# Patient Record
Sex: MALE | Race: WHITE | Employment: OTHER | URBAN - METROPOLITAN AREA
[De-identification: names, ages, dates, MRNs, and addresses within clinical notes are randomized per-mention and may not be internally consistent; named-entity substitution may affect disease eponyms.]

---

## 2017-08-10 ENCOUNTER — HOSPITAL ENCOUNTER (EMERGENCY)
Age: 62
Discharge: HOME | End: 2017-08-10
Attending: EMERGENCY MEDICINE | Admitting: EMERGENCY MEDICINE
Payer: MEDICARE

## 2017-08-10 ENCOUNTER — APPOINTMENT (OUTPATIENT)
Dept: GENERAL RADIOLOGY | Age: 62
End: 2017-08-10
Attending: EMERGENCY MEDICINE
Payer: MEDICARE

## 2017-08-10 ENCOUNTER — APPOINTMENT (OUTPATIENT)
Dept: CT IMAGING | Age: 62
End: 2017-08-10
Attending: EMERGENCY MEDICINE
Payer: MEDICARE

## 2017-08-10 VITALS
OXYGEN SATURATION: 98 % | RESPIRATION RATE: 20 BRPM | DIASTOLIC BLOOD PRESSURE: 66 MMHG | HEART RATE: 65 BPM | TEMPERATURE: 98.1 F | SYSTOLIC BLOOD PRESSURE: 127 MMHG

## 2017-08-10 DIAGNOSIS — R07.9 CHEST PAIN, UNSPECIFIED TYPE: Primary | ICD-10-CM

## 2017-08-10 LAB
ALBUMIN SERPL BCP-MCNC: 3.5 G/DL (ref 3.5–5)
ALBUMIN/GLOB SERPL: 0.8 {RATIO} (ref 1.1–2.2)
ALP SERPL-CCNC: 83 U/L (ref 45–117)
ALT SERPL-CCNC: 28 U/L (ref 12–78)
ANION GAP BLD CALC-SCNC: 4 MMOL/L (ref 5–15)
AST SERPL W P-5'-P-CCNC: 24 U/L (ref 15–37)
BASOPHILS # BLD AUTO: 0.1 K/UL (ref 0–0.1)
BASOPHILS # BLD: 1 % (ref 0–1)
BILIRUB SERPL-MCNC: 0.4 MG/DL (ref 0.2–1)
BUN SERPL-MCNC: 12 MG/DL (ref 6–20)
BUN/CREAT SERPL: 11 (ref 12–20)
CALCIUM SERPL-MCNC: 8.4 MG/DL (ref 8.5–10.1)
CHLORIDE SERPL-SCNC: 101 MMOL/L (ref 97–108)
CO2 SERPL-SCNC: 30 MMOL/L (ref 21–32)
CREAT SERPL-MCNC: 1.09 MG/DL (ref 0.7–1.3)
EOSINOPHIL # BLD: 0.2 K/UL (ref 0–0.4)
EOSINOPHIL NFR BLD: 2 % (ref 0–7)
ERYTHROCYTE [DISTWIDTH] IN BLOOD BY AUTOMATED COUNT: 13 % (ref 11.5–14.5)
GLOBULIN SER CALC-MCNC: 4.4 G/DL (ref 2–4)
GLUCOSE SERPL-MCNC: 117 MG/DL (ref 65–100)
HCT VFR BLD AUTO: 42.3 % (ref 36.6–50.3)
HGB BLD-MCNC: 14.4 G/DL (ref 12.1–17)
LYMPHOCYTES # BLD AUTO: 15 % (ref 12–49)
LYMPHOCYTES # BLD: 1.6 K/UL (ref 0.8–3.5)
MCH RBC QN AUTO: 29.4 PG (ref 26–34)
MCHC RBC AUTO-ENTMCNC: 34 G/DL (ref 30–36.5)
MCV RBC AUTO: 86.3 FL (ref 80–99)
MONOCYTES # BLD: 0.7 K/UL (ref 0–1)
MONOCYTES NFR BLD AUTO: 7 % (ref 5–13)
NEUTS SEG # BLD: 8.2 K/UL (ref 1.8–8)
NEUTS SEG NFR BLD AUTO: 75 % (ref 32–75)
PLATELET # BLD AUTO: 372 K/UL (ref 150–400)
POTASSIUM SERPL-SCNC: 3.8 MMOL/L (ref 3.5–5.1)
PROT SERPL-MCNC: 7.9 G/DL (ref 6.4–8.2)
RBC # BLD AUTO: 4.9 M/UL (ref 4.1–5.7)
SODIUM SERPL-SCNC: 135 MMOL/L (ref 136–145)
TROPONIN I SERPL-MCNC: <0.04 NG/ML
WBC # BLD AUTO: 10.8 K/UL (ref 4.1–11.1)

## 2017-08-10 PROCEDURE — 85025 COMPLETE CBC W/AUTO DIFF WBC: CPT | Performed by: EMERGENCY MEDICINE

## 2017-08-10 PROCEDURE — 74011250636 HC RX REV CODE- 250/636: Performed by: EMERGENCY MEDICINE

## 2017-08-10 PROCEDURE — 96374 THER/PROPH/DIAG INJ IV PUSH: CPT

## 2017-08-10 PROCEDURE — 74177 CT ABD & PELVIS W/CONTRAST: CPT

## 2017-08-10 PROCEDURE — 96375 TX/PRO/DX INJ NEW DRUG ADDON: CPT

## 2017-08-10 PROCEDURE — 74020 XR ABD (AP AND ERECT OR DECUB): CPT

## 2017-08-10 PROCEDURE — 73564 X-RAY EXAM KNEE 4 OR MORE: CPT

## 2017-08-10 PROCEDURE — 80053 COMPREHEN METABOLIC PANEL: CPT | Performed by: EMERGENCY MEDICINE

## 2017-08-10 PROCEDURE — 74011636320 HC RX REV CODE- 636/320: Performed by: EMERGENCY MEDICINE

## 2017-08-10 PROCEDURE — 84484 ASSAY OF TROPONIN QUANT: CPT | Performed by: EMERGENCY MEDICINE

## 2017-08-10 PROCEDURE — 74011000258 HC RX REV CODE- 258: Performed by: EMERGENCY MEDICINE

## 2017-08-10 PROCEDURE — 93005 ELECTROCARDIOGRAM TRACING: CPT

## 2017-08-10 PROCEDURE — 73610 X-RAY EXAM OF ANKLE: CPT

## 2017-08-10 PROCEDURE — 71020 XR CHEST PA LAT: CPT

## 2017-08-10 PROCEDURE — 99284 EMERGENCY DEPT VISIT MOD MDM: CPT

## 2017-08-10 RX ORDER — PREDNISONE 20 MG/1
TABLET ORAL
Qty: 20 TAB | Refills: 0 | Status: SHIPPED | OUTPATIENT
Start: 2017-08-10

## 2017-08-10 RX ORDER — KETOROLAC TROMETHAMINE 30 MG/ML
30 INJECTION, SOLUTION INTRAMUSCULAR; INTRAVENOUS
Status: COMPLETED | OUTPATIENT
Start: 2017-08-10 | End: 2017-08-10

## 2017-08-10 RX ORDER — SODIUM CHLORIDE 0.9 % (FLUSH) 0.9 %
10 SYRINGE (ML) INJECTION
Status: COMPLETED | OUTPATIENT
Start: 2017-08-10 | End: 2017-08-10

## 2017-08-10 RX ORDER — MORPHINE SULFATE 10 MG/ML
8 INJECTION, SOLUTION INTRAMUSCULAR; INTRAVENOUS
Status: COMPLETED | OUTPATIENT
Start: 2017-08-10 | End: 2017-08-10

## 2017-08-10 RX ADMIN — IOPAMIDOL 100 ML: 755 INJECTION, SOLUTION INTRAVENOUS at 18:13

## 2017-08-10 RX ADMIN — MORPHINE SULFATE 8 MG: 10 INJECTION, SOLUTION INTRAMUSCULAR; INTRAVENOUS at 17:53

## 2017-08-10 RX ADMIN — Medication 10 ML: at 18:13

## 2017-08-10 RX ADMIN — SODIUM CHLORIDE 100 ML: 900 INJECTION, SOLUTION INTRAVENOUS at 18:13

## 2017-08-10 RX ADMIN — KETOROLAC TROMETHAMINE 30 MG: 30 INJECTION, SOLUTION INTRAMUSCULAR at 16:51

## 2017-08-10 NOTE — ED PROVIDER NOTES
HPI Comments: 57 y.o. male with past medical history significant for hypertension, kidney stones, appendectomy, cholecystectomy, and back surgery (L5-S1) who presents from home via EMS with chief complaint of chest pain. Patient states onset of intermittent, moderate, and mid-sternal chest pain since this morning at 0930 approximately 7 hours ago. Patient mentions the pain is \"sharp\" and lasts approximately 15-20 minutes each episode, but he had a prolonged episode prior to calling EMS today. Patient reports the chest pain is aggravated upon movement and upon palpation. Patient denies any worsening pain with deep breaths. Patient admits to some pain at this time but it is minimal. Patient complains of accompanying mild shortness of breath and nausea. Patient notes chronic bilateral leg swelling that has not changed. Patient reports he typically walks with a cane; however, the wife states the patient has been unable to walk for the past 3 days. Patient suspects this is from new moderate right-sided knee pain that feels like it is \"splitting from (his) body. \" Patient reports accompanying right thigh pain described as \"knife in right thigh. \" Patient also complains of bilateral ankle pain that is worse on the right side. Patient mentions hx of sarcoidosis, which apparently feels similar to today but his chest pain is worse now. Patient mentions hx of arthritis and has used steroids in the past. Patient reports he had a stress test \"years ago\" that was unremarkable. Patient denies any recent heart catheterizations or heart attacks. Patient only mentions family hx of heart attacks and he has a hx of hypertension. Patient reports taking Lasix and HCTZ, but recently started on Amitiza for constipation from pain medications. Patient denies vomiting, fever, cough, and any recent illnesses. There are no other acute medical concerns at this time.     Social hx: Tobacco Use: No, Alcohol Use: No    PCP: Jose Rivas MD    Note written by Steve Heaton, as dictated by Brenda Malagon MD 4:19 PM             The history is provided by the patient. Past Medical History:   Diagnosis Date    Hypertension     Other ill-defined conditions     kidney stones       Past Surgical History:   Procedure Laterality Date    HX APPENDECTOMY      HX BACK SURGERY      L5 - S1    HX CHOLECYSTECTOMY           No family history on file. Social History     Social History    Marital status:      Spouse name: N/A    Number of children: N/A    Years of education: N/A     Occupational History    Not on file. Social History Main Topics    Smoking status: Never Smoker    Smokeless tobacco: Never Used    Alcohol use No    Drug use: No    Sexual activity: Not on file     Other Topics Concern    Not on file     Social History Narrative    No narrative on file         ALLERGIES: Review of patient's allergies indicates no known allergies. Review of Systems   Constitutional: Negative for fever. HENT: Negative for congestion. Eyes: Negative for visual disturbance. Respiratory: Positive for shortness of breath. Negative for cough and wheezing. Cardiovascular: Positive for chest pain and leg swelling (chronic). Gastrointestinal: Positive for nausea. Negative for abdominal pain, diarrhea and vomiting. Genitourinary: Negative for dysuria. Musculoskeletal: Positive for arthralgias (right ankle and right knee) and myalgias. Negative for back pain and neck stiffness. Skin: Negative for rash. Neurological: Negative. Negative for syncope and headaches. Psychiatric/Behavioral: Negative for confusion. All other systems reviewed and are negative. There were no vitals filed for this visit. Physical Exam   Constitutional: He appears well-developed and well-nourished. No distress. HENT:   Head: Normocephalic. Eyes: Pupils are equal, round, and reactive to light. Neck: Normal range of motion. Cardiovascular: Normal rate and regular rhythm. No murmur heard. Pt pulse in the right leg is intact    Pulmonary/Chest: Effort normal and breath sounds normal. No respiratory distress. He exhibits tenderness. Some chest sternum tenderness upon palpation that is reproducible    Abdominal: Soft. There is no tenderness. Abdomen is non-tender upon palpation    Musculoskeletal: Normal range of motion. He exhibits tenderness. He exhibits no edema. Right knee has mild tenderness upon palpation. No signs of redness or swelling   Right ankle has mild pain upon palpation    Neurological: He is alert. He has normal strength. No cranial nerve deficit. Skin: Skin is warm and dry. Psychiatric: He has a normal mood and affect. His behavior is normal.   Nursing note and vitals reviewed. Note written by Steve Govea, as dictated by Suri Green MD 4:19 PM    [facility]  ED Course       Procedures    ED EKG interpretation:  Rhythm: normal sinus rhythm; and regular . Rate (approx.): 76; Axis: normal; ST/T wave: non-specific changes; No acute ST changes. Note written by Steve Govea, as dictated by Suri Green MD 4:22 PM    PROGRESS NOTE:  6:48 PM  Discussed results with the patient and his wife. Patient does not have a bowel obstruction at this time and his heart lab results are unremarkable. Will discharge the patient on Prednisone to help with the knee and ankle pain, which is suspected to be from arthritis.

## 2017-08-10 NOTE — ED TRIAGE NOTES
TRIAGE NOTE: Pt arrives via EMS for substernal non radiating chest pain when he woke up ~0930. Pt received nitro and 324ASA via EMS. PT also complains of ongoing RIGHT leg weakness.

## 2017-08-10 NOTE — DISCHARGE INSTRUCTIONS
Chest Pain: Care Instructions  Your Care Instructions  There are many things that can cause chest pain. Some are not serious and will get better on their own in a few days. But some kinds of chest pain need more testing and treatment. Your doctor may have recommended a follow-up visit in the next 8 to 12 hours. If you are not getting better, you may need more tests or treatment. Even though your doctor has released you, you still need to watch for any problems. The doctor carefully checked you, but sometimes problems can develop later. If you have new symptoms or if your symptoms do not get better, get medical care right away. If you have worse or different chest pain or pressure that lasts more than 5 minutes or you passed out (lost consciousness), call 911 or seek other emergency help right away. A medical visit is only one step in your treatment. Even if you feel better, you still need to do what your doctor recommends, such as going to all suggested follow-up appointments and taking medicines exactly as directed. This will help you recover and help prevent future problems. How can you care for yourself at home? · Rest until you feel better. · Take your medicine exactly as prescribed. Call your doctor if you think you are having a problem with your medicine. · Do not drive after taking a prescription pain medicine. When should you call for help? Call 911 if:  · You passed out (lost consciousness). · You have severe difficulty breathing. · You have symptoms of a heart attack. These may include:  ¨ Chest pain or pressure, or a strange feeling in your chest.  ¨ Sweating. ¨ Shortness of breath. ¨ Nausea or vomiting. ¨ Pain, pressure, or a strange feeling in your back, neck, jaw, or upper belly or in one or both shoulders or arms. ¨ Lightheadedness or sudden weakness. ¨ A fast or irregular heartbeat.   After you call 911, the  may tell you to chew 1 adult-strength or 2 to 4 low-dose aspirin. Wait for an ambulance. Do not try to drive yourself. Call your doctor today if:  · You have any trouble breathing. · Your chest pain gets worse. · You are dizzy or lightheaded, or you feel like you may faint. · You are not getting better as expected. · You are having new or different chest pain. Where can you learn more? Go to http://acdy-alex.info/. Enter A120 in the search box to learn more about \"Chest Pain: Care Instructions. \"  Current as of: March 20, 2017  Content Version: 11.3  © 5923-8314 [employer]. Care instructions adapted under license by [employer] (which disclaims liability or warranty for this information). If you have questions about a medical condition or this instruction, always ask your healthcare professional. [address] any warranty or liability for your use of this information.

## 2017-08-10 NOTE — ED NOTES
IV discontinued from the left forearm without any redness. Patient discharged home by Dr. Jung Turk. Prescriptions and discharge instructions given. Patient verbalized understanding of discharge instructions.

## 2017-08-11 LAB
ATRIAL RATE: 76 BPM
CALCULATED P AXIS, ECG09: 54 DEGREES
CALCULATED R AXIS, ECG10: -43 DEGREES
CALCULATED T AXIS, ECG11: 30 DEGREES
DIAGNOSIS, 93000: NORMAL
P-R INTERVAL, ECG05: 124 MS
Q-T INTERVAL, ECG07: 412 MS
QRS DURATION, ECG06: 108 MS
QTC CALCULATION (BEZET), ECG08: 463 MS
VENTRICULAR RATE, ECG03: 76 BPM

## 2018-06-14 ENCOUNTER — ANESTHESIA (OUTPATIENT)
Dept: ENDOSCOPY | Age: 63
End: 2018-06-14

## 2018-06-14 ENCOUNTER — HOSPITAL ENCOUNTER (OUTPATIENT)
Age: 63
Setting detail: OUTPATIENT SURGERY
Discharge: HOME | End: 2018-06-14
Attending: INTERNAL MEDICINE | Admitting: INTERNAL MEDICINE

## 2018-06-14 ENCOUNTER — ANESTHESIA EVENT (OUTPATIENT)
Dept: ENDOSCOPY | Age: 63
End: 2018-06-14

## 2018-06-14 VITALS
RESPIRATION RATE: 22 BRPM | BODY MASS INDEX: 31.12 KG/M2 | WEIGHT: 269 LBS | HEIGHT: 78 IN | DIASTOLIC BLOOD PRESSURE: 74 MMHG | TEMPERATURE: 97.9 F | SYSTOLIC BLOOD PRESSURE: 136 MMHG | HEART RATE: 88 BPM | OXYGEN SATURATION: 98 %

## 2018-06-14 RX ORDER — DEXTROMETHORPHAN/PSEUDOEPHED 2.5-7.5/.8
1.2 DROPS ORAL
Status: DISCONTINUED | OUTPATIENT
Start: 2018-06-14 | End: 2018-06-15 | Stop reason: HOSPADM

## 2018-06-14 RX ORDER — DIPHENHYDRAMINE HYDROCHLORIDE 50 MG/ML
50 INJECTION, SOLUTION INTRAMUSCULAR; INTRAVENOUS ONCE
Status: ACTIVE | OUTPATIENT
Start: 2018-06-14 | End: 2018-06-14

## 2018-06-14 RX ORDER — FENTANYL CITRATE 50 UG/ML
100 INJECTION, SOLUTION INTRAMUSCULAR; INTRAVENOUS
Status: ACTIVE | OUTPATIENT
Start: 2018-06-14 | End: 2018-06-14

## 2018-06-14 RX ORDER — FLUMAZENIL 0.1 MG/ML
0.2 INJECTION INTRAVENOUS
Status: ACTIVE | OUTPATIENT
Start: 2018-06-14 | End: 2018-06-14

## 2018-06-14 RX ORDER — MIDAZOLAM HYDROCHLORIDE 1 MG/ML
.25-1 INJECTION, SOLUTION INTRAMUSCULAR; INTRAVENOUS
Status: ACTIVE | OUTPATIENT
Start: 2018-06-14 | End: 2018-06-14

## 2018-06-14 RX ORDER — EPINEPHRINE 0.1 MG/ML
1 INJECTION INTRACARDIAC; INTRAVENOUS
Status: DISCONTINUED | OUTPATIENT
Start: 2018-06-14 | End: 2018-06-15 | Stop reason: HOSPADM

## 2018-06-14 RX ORDER — ATROPINE SULFATE 0.1 MG/ML
0.5 INJECTION INTRAVENOUS
Status: DISCONTINUED | OUTPATIENT
Start: 2018-06-14 | End: 2018-06-15 | Stop reason: HOSPADM

## 2018-06-14 RX ORDER — SODIUM CHLORIDE 0.9 % (FLUSH) 0.9 %
5-10 SYRINGE (ML) INJECTION EVERY 8 HOURS
Status: ACTIVE | OUTPATIENT
Start: 2018-06-14 | End: 2018-06-14

## 2018-06-14 RX ORDER — SODIUM CHLORIDE 9 MG/ML
100 INJECTION, SOLUTION INTRAVENOUS CONTINUOUS
Status: DISPENSED | OUTPATIENT
Start: 2018-06-14 | End: 2018-06-14

## 2018-06-14 RX ORDER — SODIUM CHLORIDE 0.9 % (FLUSH) 0.9 %
5-10 SYRINGE (ML) INJECTION AS NEEDED
Status: ACTIVE | OUTPATIENT
Start: 2018-06-14 | End: 2018-06-14

## 2018-06-14 RX ORDER — NALOXONE HYDROCHLORIDE 0.4 MG/ML
0.4 INJECTION, SOLUTION INTRAMUSCULAR; INTRAVENOUS; SUBCUTANEOUS
Status: ACTIVE | OUTPATIENT
Start: 2018-06-14 | End: 2018-06-14

## 2018-06-14 RX ORDER — SODIUM CHLORIDE 9 MG/ML
INJECTION, SOLUTION INTRAVENOUS
Status: DISCONTINUED | OUTPATIENT
Start: 2018-06-14 | End: 2018-06-14 | Stop reason: HOSPADM

## 2018-06-14 RX ADMIN — SODIUM CHLORIDE: 9 INJECTION, SOLUTION INTRAVENOUS at 11:00

## 2018-06-14 NOTE — ANESTHESIA PREPROCEDURE EVALUATION
Anesthetic History   No history of anesthetic complications            Review of Systems / Medical History  Patient summary reviewed, nursing notes reviewed and pertinent labs reviewed    Pulmonary  Within defined limits                 Neuro/Psych   Within defined limits           Cardiovascular    Hypertension              Exercise tolerance: >4 METS     GI/Hepatic/Renal         Renal disease: stones      Comments: Dysphagia Endo/Other  Within defined limits           Other Findings              Physical Exam    Airway  Mallampati: III  TM Distance: 4 - 6 cm  Neck ROM: normal range of motion   Mouth opening: Normal     Cardiovascular  Regular rate and rhythm,  S1 and S2 normal,  no murmur, click, rub, or gallop             Dental  No notable dental hx       Pulmonary  Breath sounds clear to auscultation               Abdominal  GI exam deferred       Other Findings            Anesthetic Plan    ASA: 2  Anesthesia type: MAC          Induction: Intravenous  Anesthetic plan and risks discussed with: Patient

## 2018-06-14 NOTE — H&P
[address]  [facility]      History and Physical       NAME:  Crow Figueroa   :   1955   MRN:   [id number]             History of Present Illness:  Patient is a 70 y.o. who is seen for dysphagia and personal history of colon polyps. His last colonoscopy was > 10 years ago and polyps were removed. PMH:  Past Medical History:   Diagnosis Date    Asthma 2017    Hypertension     Other ill-defined conditions(699.86)     kidney stones       PSH:  Past Surgical History:   Procedure Laterality Date    HX APPENDECTOMY      HX BACK SURGERY      L5 - S1    HX CHOLECYSTECTOMY         Allergies:  No Known Allergies    Home Medications:  Prior to Admission Medications   Prescriptions Last Dose Informant Patient Reported? Taking? HYDROcodone-acetaminophen (NORCO) 5-325 mg per tablet 2018 at Unknown time  No Yes   Si-2 tabs PO Q 4-6 hrs PRN   Morphine (RENY) 60 mg ER capsule 2018 at Unknown time Significant Other Yes Yes   Sig: Take 60 mg by mouth three (3) times daily. clonazePAM (KLONOPIN) 1 mg tablet 2018 at Unknown time Significant Other Yes Yes   Sig: Take 1 mg by mouth two (2) times a day. escitalopram (LEXAPRO) 10 mg tablet 2018 at Unknown time Significant Other Yes Yes   Sig: Take 10 mg by mouth daily. furosemide (LASIX) 20 mg tablet 2018 at Unknown time Significant Other Yes Yes   Sig: Take 20 mg by mouth two (2) times a day.   gabapentin (NEURONTIN) 600 mg tablet 2018 at Unknown time Significant Other Yes Yes   Sig: Take 600 mg by mouth three (3) times daily. potassium chloride (KLOR-CON M20) 20 mEq tablet 2018 at Unknown time Significant Other Yes Yes   Sig: Take 20 mEq by mouth as needed for Other (when taking fluid).    predniSONE (DELTASONE) 20 mg tablet 2018 at Unknown time  No Yes   Si qd for 4 d. 1 1/2 qd for 4 d. 1 qd for 4 d, 1/2 qd for 4 d.   triamterene-hydrochlorothiazide (MAXZIDE-25MG) 37.5-25 mg per tablet 6/14/2018 at Unknown time Significant Other Yes Yes   Sig: Take 1 Tab by mouth daily. zolpidem (AMBIEN) 10 mg tablet 6/14/2018 at Unknown time Significant Other Yes Yes   Sig: Take 10 mg by mouth nightly as needed for Sleep. Facility-Administered Medications: None       Hospital Medications:  Current Facility-Administered Medications   Medication Dose Route Frequency    0.9% sodium chloride infusion  100 mL/hr IntraVENous CONTINUOUS    sodium chloride (NS) flush 5-10 mL  5-10 mL IntraVENous Q8H    sodium chloride (NS) flush 5-10 mL  5-10 mL IntraVENous PRN    midazolam (VERSED) injection 0.25-10 mg  0.25-10 mg IntraVENous Multiple    fentaNYL citrate (PF) injection 100 mcg  100 mcg IntraVENous Multiple    naloxone (NARCAN) injection 0.4 mg  0.4 mg IntraVENous Multiple    flumazenil (ROMAZICON) 0.1 mg/mL injection 0.2 mg  0.2 mg IntraVENous Multiple    simethicone (MYLICON) 03DU/5.4BK oral drops 80 mg  1.2 mL Oral Multiple    diphenhydrAMINE (BENADRYL) injection 50 mg  50 mg IntraVENous ONCE    atropine injection 0.5 mg  0.5 mg IntraVENous ONCE PRN    EPINEPHrine (ADRENALIN) 0.1 mg/mL syringe 1 mg  1 mg Endoscopically ONCE PRN       Social History:  Social History   Substance Use Topics    Smoking status: Never Smoker    Smokeless tobacco: Never Used    Alcohol use No       Family History:  History reviewed. No pertinent family history.           Review of Systems:      Constitutional: negative fever, negative chills, negative weight loss  Eyes:   negative visual changes  ENT:   negative sore throat, tongue or lip swelling  Respiratory:  negative cough, negative dyspnea  Cards:  negative for chest pain, palpitations, lower extremity edema  GI:   See HPI  :  negative for frequency, dysuria  Integument:  negative for rash and pruritus  Heme:  negative for easy bruising and gum/nose bleeding  Musculoskel: negative for myalgias,  back pain and muscle weakness  Neuro: negative for headaches, dizziness, vertigo  Psych:  negative for feelings of anxiety, depression       Objective:   No data found. EXAM:     NEURO-a&o   HEENT-wnl   LUNGS-clear    COR-regular rate and rhythym     ABD-soft , no tenderness, no rebound, good bs     EXT-no edema     Data Review     No results for input(s): WBC, HGB, HCT, PLT, HGBEXT, HCTEXT, PLTEXT in the last 72 hours. No results for input(s): NA, K, CL, CO2, BUN, CREA, GLU, PHOS, CA in the last 72 hours. No results for input(s): SGOT, GPT, AP, TBIL, TP, ALB, GLOB, GGT, AML, LPSE in the last 72 hours. No lab exists for component: AMYP, HLPSE  No results for input(s): INR, PTP, APTT in the last 72 hours. No lab exists for component: INREXT       Assessment:   · History of colon polyps  · Dysphagia   There is no problem list on file for this patient. Plan:   · Endoscopic procedure with MAC     Signed By: Myles Rollins.  Kimberley Ortega MD     6/14/2018  11:10 AM

## 2018-06-14 NOTE — PERIOP NOTES

## 2018-06-15 NOTE — ADDENDUM NOTE
Addendum  created 06/15/18 1246 by Eric Villa, CRNA    Anesthesia Staff edited, Procedure Event Log accessed

## 2020-12-23 ENCOUNTER — HOSPITAL ENCOUNTER (EMERGENCY)
Age: 65
Discharge: HOME | End: 2020-12-23
Attending: EMERGENCY MEDICINE
Payer: MEDICARE

## 2020-12-23 ENCOUNTER — APPOINTMENT (OUTPATIENT)
Dept: CT IMAGING | Age: 65
End: 2020-12-23
Attending: EMERGENCY MEDICINE
Payer: MEDICARE

## 2020-12-23 VITALS
DIASTOLIC BLOOD PRESSURE: 85 MMHG | RESPIRATION RATE: 16 BRPM | TEMPERATURE: 96.8 F | SYSTOLIC BLOOD PRESSURE: 111 MMHG | BODY MASS INDEX: 28.23 KG/M2 | HEART RATE: 87 BPM | OXYGEN SATURATION: 98 % | WEIGHT: 241.18 LBS

## 2020-12-23 DIAGNOSIS — M54.50 ACUTE BILATERAL LOW BACK PAIN WITHOUT SCIATICA: ICD-10-CM
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
## 2020-12-23 PROCEDURE — 99284 EMERGENCY DEPT VISIT MOD MDM: CPT

## 2020-12-23 PROCEDURE — 74011250637 HC RX REV CODE- 250/637: Performed by: EMERGENCY MEDICINE

## 2020-12-23 PROCEDURE — 72131 CT LUMBAR SPINE W/O DYE: CPT

## 2020-12-23 RX ORDER — ACETAMINOPHEN 500 MG
1000 TABLET ORAL
Status: COMPLETED | OUTPATIENT
Start: 2020-12-23 | End: 2020-12-23

## 2020-12-23 RX ADMIN — ACETAMINOPHEN 1000 MG: 500 TABLET ORAL at 21:00

## 2020-12-24 NOTE — DISCHARGE INSTRUCTIONS
We hope that we have addressed all of your medical concerns. The examination and treatment you received in the Emergency Department were for an emergent problem and were not intended as complete care. It is important that you follow up with your healthcare provider(s) for ongoing care. If your symptoms worsen or do not improve as expected, and you are unable to reach your usual health care provider(s), you should return to the Emergency Department. Today's healthcare is undergoing tremendous change, and patient satisfaction surveys are one of the many tools to assess the quality of medical care. You may receive a survey from the [employer] regarding your experience in the Emergency Department. I hope that your experience has been completely positive, particularly the medical care that I provided. As such, please participate in the survey; anything less than excellent does not meet my expectations or intentions. [facility] and [address] [place] [address] participate in nationally recognized quality of care measures. If your blood pressure is greater than 120/80, as reported below, we urge that you seek medical care to address the potential of high blood pressure, commonly known as hypertension. Hypertension can be hereditary or can be caused by certain medical conditions, pain, stress, or \"white coat syndrome. \"       Please make an appointment with your health care provider(s) for follow up of your Emergency Department visit. VITALS:   Patient Vitals for the past 8 hrs:   Temp Pulse Resp BP SpO2   12/23/20 1949 96.8 °F (36 °C) 87 16 122/70 100 %          Thank you for allowing us to provide you with medical care today. We realize that you have many choices for your emergency care needs. Please choose us in the future for any continued health care needs. Layne Muñoz, [address]. Office: [phone]            No results found for this or any previous visit (from the past 24 hour(s)). Ct Spine Lumb Wo Cont    Result Date: 12/23/2020  EXAM:  CT LUMBAR SPINE WITHOUT  CONTRAST INDICATION: fall 10 ft from ladder. COMPARISON: None. CONTRAST:  None. TECHNIQUE: Multislice helical CT of the lumbar spine was performed without intravenous contrast administration. Coronal and sagittal reformats were generated. CT dose reduction was achieved through use of a standardized protocol tailored for this examination and automatic exposure control for dose modulation. Adaptive statistical iterative reconstruction (ASIR) was utilized. FINDINGS: There is no acute fracture. There is kyphoplasty treated remote fracture of T12. The alignment is within normal limits. There is previous posterior decompression at L4 and L5. The paravertebral soft tissues are within normal limits. Lower thoracic spine: No herniation or stenosis. L1-L2: There is no spinal canal or neural foraminal stenosis. L2-L3: There is no spinal canal or neural foraminal stenosis. L3-L4: There is no spinal canal or neural foraminal stenosis. L4-L5: There is no spinal canal or neural foraminal stenosis. L5-S1: There is no spinal canal or neural foraminal stenosis. IMPRESSION: No acute finding.

## 2020-12-24 NOTE — ED PROVIDER NOTES
This is a 47-year-old gentleman comes emergency room with chief complaint of fall. Patient also complains of low back pain. Patient initially told nurses that he fell couple days ago onto the floor and is now having low back pain. When asked why he was here today, the patient states that he was on a ladder approximately 10 feet up when he fell onto the ground. Patient states he fell backwards onto his back. Patient denies any loss consciousness. Patient denies any pain in his upper or lower extremities. Patient is only complaining of low back pain. Patient does have a history of low back/lumbar surgeries. Patient denies any bowel or urinary retention. Patient denies any bowel or urinary incontinence. Patient denies any fever or chills. The history is provided by the patient. No  was used. Fall  Incident onset: unknown exactly. The fall occurred from a ladder. He fell from a height of 6 - 10 ft. He landed on grass. There was no blood loss. The pain is at a severity of 10/10. The pain is severe. He was ambulatory at the scene. There was no entrapment after the fall. There was no alcohol use involved in the accident. Pertinent negatives include no fever, no numbness, no abdominal pain, no nausea, no vomiting, no hematuria, no extremity weakness, no loss of consciousness, no tingling and no laceration. The risk factors include being elderly and recurrent falls. The symptoms are aggravated by pressure on injury. Past Medical History:   Diagnosis Date    Asthma 2017    Hypertension     Other ill-defined conditions(789.89)     kidney stones       Past Surgical History:   Procedure Laterality Date    HX APPENDECTOMY      HX BACK SURGERY      L5 - S1    HX CHOLECYSTECTOMY           History reviewed. No pertinent family history.     Social History     Socioeconomic History    Marital status:      Spouse name: Not on file    Number of children: Not on file    Years of education: Not on file    Highest education level: Not on file   Occupational History    Not on file   Social Needs    Financial resource strain: Not on file    Food insecurity     Worry: Not on file     Inability: Not on file    Transportation needs     Medical: Not on file     Non-medical: Not on file   Tobacco Use    Smoking status: Never Smoker    Smokeless tobacco: Never Used   Substance and Sexual Activity    Alcohol use: No    Drug use: No    Sexual activity: Not on file   Lifestyle    Physical activity     Days per week: Not on file     Minutes per session: Not on file    Stress: Not on file   Relationships    Social connections     Talks on phone: Not on file     Gets together: Not on file     Attends Taoist service: Not on file     Active member of club or organization: Not on file     Attends meetings of clubs or organizations: Not on file     Relationship status: Not on file    Intimate partner violence     Fear of current or ex partner: Not on file     Emotionally abused: Not on file     Physically abused: Not on file     Forced sexual activity: Not on file   Other Topics Concern    Not on file   Social History Narrative    Not on file     ALLERGIES: Heparin    Review of Systems   Constitutional: Negative for appetite change, chills, fever and unexpected weight change. HENT: Negative for ear pain, hearing loss, rhinorrhea and trouble swallowing. Eyes: Negative for pain and visual disturbance. Respiratory: Negative for cough, chest tightness and shortness of breath. Cardiovascular: Negative for chest pain and palpitations. Gastrointestinal: Negative for abdominal distention, abdominal pain, blood in stool, nausea and vomiting. Genitourinary: Negative for dysuria, hematuria and urgency. Musculoskeletal: Positive for back pain. Negative for extremity weakness and myalgias. Skin: Negative for rash.    Neurological: Negative for dizziness, tingling, loss of consciousness, syncope, weakness and numbness. Psychiatric/Behavioral: Negative for confusion and suicidal ideas. All other systems reviewed and are negative. Vitals:    12/23/20 2030 12/23/20 2045 12/23/20 2100 12/23/20 2115   BP: 129/68 116/78 (!) 143/74 111/85   Pulse:       Resp:       Temp:       SpO2: 98% 99% 98% 98%   Weight:                Physical Exam  Vitals signs and nursing note reviewed. Constitutional:       General: He is not in acute distress. Appearance: Normal appearance. He is well-developed. He is not diaphoretic. HENT:      Head: Normocephalic and atraumatic. Right Ear: External ear normal.      Left Ear: External ear normal.   Eyes:      General: No scleral icterus. Right eye: No discharge. Left eye: No discharge. Extraocular Movements: Extraocular movements intact. Conjunctiva/sclera: Conjunctivae normal.      Pupils: Pupils are equal, round, and reactive to light. Neck:      Musculoskeletal: Normal range of motion and neck supple. Vascular: No JVD. Trachea: No tracheal deviation. Cardiovascular:      Rate and Rhythm: Normal rate and regular rhythm. Heart sounds: Normal heart sounds. No murmur. No friction rub. No gallop. Pulmonary:      Effort: Pulmonary effort is normal. No respiratory distress. Breath sounds: Normal breath sounds. No stridor. No decreased breath sounds, wheezing, rhonchi or rales. Chest:      Chest wall: No tenderness. Abdominal:      General: Bowel sounds are normal. There is no distension. Palpations: Abdomen is soft. Tenderness: There is no abdominal tenderness. There is no guarding or rebound. Musculoskeletal: Normal range of motion. General: Tenderness present. Lumbar back: He exhibits tenderness and pain. He exhibits no bony tenderness and no deformity. Back:       Right lower leg: No edema. Left lower leg: No edema. Skin:     General: Skin is warm and dry. Coloration: Skin is not pale. Findings: No erythema, laceration or rash. Neurological:      Mental Status: He is alert and oriented to person, place, and time. GCS: GCS eye subscore is 4. GCS verbal subscore is 5. GCS motor subscore is 6. Cranial Nerves: No cranial nerve deficit. Sensory: Sensation is intact. No sensory deficit. Motor: Motor function is intact. No weakness or abnormal muscle tone. Coordination: Coordination normal.      Deep Tendon Reflexes: Reflexes are normal and symmetric. Reflexes normal.      Comments: Strength is 5/5 in the lower extremities. There is no sensory or motor deficits noted. Psychiatric:         Mood and Affect: Mood normal.         Behavior: Behavior normal.         Thought Content: Thought content normal.         Judgment: Judgment normal.        MDM  Number of Diagnoses or Management Options     Amount and/or Complexity of Data Reviewed  Tests in the radiology section of CPT®: ordered and reviewed    Risk of Complications, Morbidity, and/or Mortality  Presenting problems: moderate  Diagnostic procedures: moderate  Management options: moderate    Patient Progress  Patient progress: stable       Procedures    Chief Complaint   Patient presents with    Fall    Back Pain       The patient's presenting problems have been discussed, and they are in agreement with the care plan formulated and outlined with them. I have encouraged them to ask questions as they arise throughout their visit. MEDICATIONS GIVEN:  Medications   acetaminophen (TYLENOL) tablet 1,000 mg (1,000 mg Oral Given 12/23/20 2100)       LABS REVIEWED:  No results found for this or any previous visit (from the past 24 hour(s)).     VITAL SIGNS:  Patient Vitals for the past 24 hrs:   Temp Pulse Resp BP SpO2   12/23/20 2115    111/85 98 %   12/23/20 2100    (!) 143/74 98 %   12/23/20 2045    116/78 99 %   12/23/20 2030    129/68 98 %   12/23/20 2015    120/77 100 % 12/23/20 1949 96.8 °F (36 °C) 87 16 122/70 100 %       RADIOLOGY RESULTS:  The following have been ordered and reviewed:  Ct Spine Lumb Wo Cont    Result Date: 12/23/2020  EXAM:  CT LUMBAR SPINE WITHOUT  CONTRAST INDICATION: fall 10 ft from ladder. COMPARISON: None. CONTRAST:  None. TECHNIQUE: Multislice helical CT of the lumbar spine was performed without intravenous contrast administration. Coronal and sagittal reformats were generated. CT dose reduction was achieved through use of a standardized protocol tailored for this examination and automatic exposure control for dose modulation. Adaptive statistical iterative reconstruction (ASIR) was utilized. FINDINGS: There is no acute fracture. There is kyphoplasty treated remote fracture of T12. The alignment is within normal limits. There is previous posterior decompression at L4 and L5. The paravertebral soft tissues are within normal limits. Lower thoracic spine: No herniation or stenosis. L1-L2: There is no spinal canal or neural foraminal stenosis. L2-L3: There is no spinal canal or neural foraminal stenosis. L3-L4: There is no spinal canal or neural foraminal stenosis. L4-L5: There is no spinal canal or neural foraminal stenosis. L5-S1: There is no spinal canal or neural foraminal stenosis. IMPRESSION: No acute finding. PROGRESS NOTES:  Patient given Tylenol in the emergency room. Patient then requested Lortab.  was reviewed. Patient states that he used to have a pain doctor but his pain doctor lost his license. Patient instructed to follow-up with his PCP. Patient instructed that since his CT did not show any acute fractures that he should take Tylenol Motrin and follow-up with his PCP. Discussed results and plan with patient. Patient will be discharged home with PCP follow up. Patient instructed to return to the emergency room for any worsening symptoms or any other concerns. DIAGNOSIS:    1. Fall, initial encounter    2.  Acute bilateral low back pain without sciatica        PLAN:  Follow-up Information     Follow up With Specialties Details Why Contact Info    Nunu Muir MD [facility] Schedule an appointment as soon as possible for a visit   [id number] [place] [address] [phone]          Discharge Medication List as of 12/23/2020  8:54 PM          ED COURSE: The patient's hospital course has been uncomplicated. Please note that this dictation was completed with [employer], the computer voice recognition software. Quite often unanticipated grammatical, syntax, homophones, and other interpretive errors are inadvertently transcribed by the computer software. Please disregard these errors. Please excuse any errors that have escaped final proofreading.

## 2021-01-16 ENCOUNTER — HOSPITAL ENCOUNTER (INPATIENT)
Age: 66
LOS: 3 days | Discharge: HOME | DRG: 393 | End: 2021-01-21
Attending: STUDENT IN AN ORGANIZED HEALTH CARE EDUCATION/TRAINING PROGRAM | Admitting: INTERNAL MEDICINE
Payer: MEDICARE

## 2021-01-16 ENCOUNTER — APPOINTMENT (OUTPATIENT)
Dept: CT IMAGING | Age: 66
DRG: 393 | End: 2021-01-16
Attending: STUDENT IN AN ORGANIZED HEALTH CARE EDUCATION/TRAINING PROGRAM
Payer: MEDICARE

## 2021-01-16 DIAGNOSIS — K56.2 SIGMOID VOLVULUS (HCC): Primary | ICD-10-CM

## 2021-01-16 LAB
ALBUMIN SERPL-MCNC: 3.9 G/DL (ref 3.5–5)
ALBUMIN/GLOB SERPL: 0.9 {RATIO} (ref 1.1–2.2)
ALP SERPL-CCNC: 127 U/L (ref 45–117)
ALT SERPL-CCNC: 16 U/L (ref 12–78)
ANION GAP SERPL CALC-SCNC: 7 MMOL/L (ref 5–15)
AST SERPL-CCNC: 24 U/L (ref 15–37)
BASOPHILS # BLD: 0.1 K/UL (ref 0–0.1)
BASOPHILS NFR BLD: 1 % (ref 0–1)
BILIRUB SERPL-MCNC: 0.6 MG/DL (ref 0.2–1)
BUN SERPL-MCNC: 9 MG/DL (ref 6–20)
BUN/CREAT SERPL: 7 (ref 12–20)
CALCIUM SERPL-MCNC: 8.8 MG/DL (ref 8.5–10.1)
CHLORIDE SERPL-SCNC: 102 MMOL/L (ref 97–108)
CO2 SERPL-SCNC: 28 MMOL/L (ref 21–32)
CREAT SERPL-MCNC: 1.24 MG/DL (ref 0.7–1.3)
DIFFERENTIAL METHOD BLD: ABNORMAL
EOSINOPHIL # BLD: 0.2 K/UL (ref 0–0.4)
EOSINOPHIL NFR BLD: 2 % (ref 0–7)
ERYTHROCYTE [DISTWIDTH] IN BLOOD BY AUTOMATED COUNT: 14.2 % (ref 11.5–14.5)
GLOBULIN SER CALC-MCNC: 4.4 G/DL (ref 2–4)
GLUCOSE SERPL-MCNC: 95 MG/DL (ref 65–100)
HCT VFR BLD AUTO: 44.2 % (ref 36.6–50.3)
HEMOCCULT STL QL: NEGATIVE
HGB BLD-MCNC: 14.8 G/DL (ref 12.1–17)
IMM GRANULOCYTES # BLD AUTO: 0 K/UL (ref 0–0.04)
IMM GRANULOCYTES NFR BLD AUTO: 0 % (ref 0–0.5)
INR PPP: 1.2 (ref 0.9–1.1)
LIPASE SERPL-CCNC: 84 U/L (ref 73–393)
LYMPHOCYTES # BLD: 1.1 K/UL (ref 0.8–3.5)
LYMPHOCYTES NFR BLD: 12 % (ref 12–49)
MCH RBC QN AUTO: 29.8 PG (ref 26–34)
MCHC RBC AUTO-ENTMCNC: 33.5 G/DL (ref 30–36.5)
MCV RBC AUTO: 88.9 FL (ref 80–99)
MONOCYTES # BLD: 0.7 K/UL (ref 0–1)
MONOCYTES NFR BLD: 8 % (ref 5–13)
NEUTS SEG # BLD: 7 K/UL (ref 1.8–8)
NEUTS SEG NFR BLD: 77 % (ref 32–75)
NRBC # BLD: 0 K/UL (ref 0–0.01)
NRBC BLD-RTO: 0 PER 100 WBC
PLATELET # BLD AUTO: ABNORMAL K/UL (ref 150–400)
POTASSIUM SERPL-SCNC: 3.6 MMOL/L (ref 3.5–5.1)
PROT SERPL-MCNC: 8.3 G/DL (ref 6.4–8.2)
PROTHROMBIN TIME: 12.2 SEC (ref 9–11.1)
RBC # BLD AUTO: 4.97 M/UL (ref 4.1–5.7)
SODIUM SERPL-SCNC: 137 MMOL/L (ref 136–145)
WBC # BLD AUTO: 9 K/UL (ref 4.1–11.1)

## 2021-01-16 PROCEDURE — 36415 COLL VENOUS BLD VENIPUNCTURE: CPT

## 2021-01-16 PROCEDURE — 99285 EMERGENCY DEPT VISIT HI MDM: CPT

## 2021-01-16 PROCEDURE — 85025 COMPLETE CBC W/AUTO DIFF WBC: CPT

## 2021-01-16 PROCEDURE — 86901 BLOOD TYPING SEROLOGIC RH(D): CPT

## 2021-01-16 PROCEDURE — 96374 THER/PROPH/DIAG INJ IV PUSH: CPT

## 2021-01-16 PROCEDURE — 74176 CT ABD & PELVIS W/O CONTRAST: CPT

## 2021-01-16 PROCEDURE — 74011250636 HC RX REV CODE- 250/636: Performed by: STUDENT IN AN ORGANIZED HEALTH CARE EDUCATION/TRAINING PROGRAM

## 2021-01-16 PROCEDURE — 80053 COMPREHEN METABOLIC PANEL: CPT

## 2021-01-16 PROCEDURE — 83690 ASSAY OF LIPASE: CPT

## 2021-01-16 PROCEDURE — 82272 OCCULT BLD FECES 1-3 TESTS: CPT

## 2021-01-16 PROCEDURE — 85610 PROTHROMBIN TIME: CPT

## 2021-01-16 RX ORDER — MORPHINE SULFATE 4 MG/ML
4 INJECTION INTRAVENOUS ONCE
Status: COMPLETED | OUTPATIENT
Start: 2021-01-16 | End: 2021-01-16

## 2021-01-16 RX ADMIN — MORPHINE SULFATE 4 MG: 4 INJECTION INTRAVENOUS at 23:14

## 2021-01-16 RX ADMIN — MORPHINE SULFATE 4 MG: 4 INJECTION INTRAVENOUS at 22:34

## 2021-01-16 NOTE — ED NOTES
Pt c/o abd pain with dark tarry stools x3 days. Pt currently taking coumadin. Pt did not take dose today.

## 2021-01-17 ENCOUNTER — APPOINTMENT (OUTPATIENT)
Dept: CT IMAGING | Age: 66
DRG: 393 | End: 2021-01-17
Attending: INTERNAL MEDICINE
Payer: MEDICARE

## 2021-01-17 PROBLEM — K56.2 VOLVULUS (HCC): Status: ACTIVE | Noted: 2021-01-17

## 2021-01-17 LAB
ABO + RH BLD: NORMAL
ANION GAP SERPL CALC-SCNC: 7 MMOL/L (ref 5–15)
ATRIAL RATE: 81 BPM
BLOOD GROUP ANTIBODIES SERPL: NORMAL
BUN SERPL-MCNC: 9 MG/DL (ref 6–20)
BUN/CREAT SERPL: 10 (ref 12–20)
CALCIUM SERPL-MCNC: 8.4 MG/DL (ref 8.5–10.1)
CALCULATED P AXIS, ECG09: 42 DEGREES
CALCULATED R AXIS, ECG10: -39 DEGREES
CALCULATED T AXIS, ECG11: 40 DEGREES
CHLORIDE SERPL-SCNC: 105 MMOL/L (ref 97–108)
CO2 SERPL-SCNC: 26 MMOL/L (ref 21–32)
CREAT SERPL-MCNC: 0.91 MG/DL (ref 0.7–1.3)
DIAGNOSIS, 93000: NORMAL
GLUCOSE SERPL-MCNC: 101 MG/DL (ref 65–100)
MAGNESIUM SERPL-MCNC: 2.1 MG/DL (ref 1.6–2.4)
P-R INTERVAL, ECG05: 158 MS
POTASSIUM SERPL-SCNC: 3.5 MMOL/L (ref 3.5–5.1)
Q-T INTERVAL, ECG07: 406 MS
QRS DURATION, ECG06: 116 MS
QTC CALCULATION (BEZET), ECG08: 471 MS
SODIUM SERPL-SCNC: 138 MMOL/L (ref 136–145)
SPECIMEN EXP DATE BLD: NORMAL
VENTRICULAR RATE, ECG03: 81 BPM

## 2021-01-17 PROCEDURE — 83735 ASSAY OF MAGNESIUM: CPT

## 2021-01-17 PROCEDURE — 80048 BASIC METABOLIC PNL TOTAL CA: CPT

## 2021-01-17 PROCEDURE — 99218 HC RM OBSERVATION: CPT

## 2021-01-17 PROCEDURE — 36415 COLL VENOUS BLD VENIPUNCTURE: CPT

## 2021-01-17 PROCEDURE — 74176 CT ABD & PELVIS W/O CONTRAST: CPT

## 2021-01-17 PROCEDURE — 74011250637 HC RX REV CODE- 250/637: Performed by: FAMILY MEDICINE

## 2021-01-17 PROCEDURE — 74011250637 HC RX REV CODE- 250/637: Performed by: NURSE PRACTITIONER

## 2021-01-17 PROCEDURE — 93005 ELECTROCARDIOGRAM TRACING: CPT

## 2021-01-17 PROCEDURE — 96376 TX/PRO/DX INJ SAME DRUG ADON: CPT

## 2021-01-17 PROCEDURE — 74011000250 HC RX REV CODE- 250: Performed by: INTERNAL MEDICINE

## 2021-01-17 PROCEDURE — 74011250636 HC RX REV CODE- 250/636: Performed by: INTERNAL MEDICINE

## 2021-01-17 PROCEDURE — 74011250636 HC RX REV CODE- 250/636: Performed by: FAMILY MEDICINE

## 2021-01-17 PROCEDURE — 94760 N-INVAS EAR/PLS OXIMETRY 1: CPT

## 2021-01-17 RX ORDER — ONDANSETRON 2 MG/ML
4 INJECTION INTRAMUSCULAR; INTRAVENOUS
Status: DISCONTINUED | OUTPATIENT
Start: 2021-01-17 | End: 2021-01-21 | Stop reason: HOSPADM

## 2021-01-17 RX ORDER — ESCITALOPRAM OXALATE 10 MG/1
20 TABLET ORAL DAILY
Status: DISCONTINUED | OUTPATIENT
Start: 2021-01-17 | End: 2021-01-21 | Stop reason: HOSPADM

## 2021-01-17 RX ORDER — FUROSEMIDE 40 MG/1
20 TABLET ORAL DAILY
Status: DISCONTINUED | OUTPATIENT
Start: 2021-01-17 | End: 2021-01-21 | Stop reason: HOSPADM

## 2021-01-17 RX ORDER — DEXTROSE, SODIUM CHLORIDE, AND POTASSIUM CHLORIDE 5; .45; .15 G/100ML; G/100ML; G/100ML
75 INJECTION INTRAVENOUS CONTINUOUS
Status: DISCONTINUED | OUTPATIENT
Start: 2021-01-17 | End: 2021-01-21

## 2021-01-17 RX ORDER — MORPHINE SULFATE 2 MG/ML
4 INJECTION, SOLUTION INTRAMUSCULAR; INTRAVENOUS EVERY 12 HOURS
Status: DISCONTINUED | OUTPATIENT
Start: 2021-01-17 | End: 2021-01-17

## 2021-01-17 RX ORDER — POLYETHYLENE GLYCOL 3350, SODIUM SULFATE, SODIUM CHLORIDE, POTASSIUM CHLORIDE, SODIUM ASCORBATE, AND ASCORBIC ACID 7.5-2.691G
2 KIT ORAL ONCE
Status: COMPLETED | OUTPATIENT
Start: 2021-01-17 | End: 2021-01-17

## 2021-01-17 RX ORDER — SODIUM CHLORIDE 0.9 % (FLUSH) 0.9 %
5-40 SYRINGE (ML) INJECTION EVERY 8 HOURS
Status: DISCONTINUED | OUTPATIENT
Start: 2021-01-17 | End: 2021-01-21 | Stop reason: HOSPADM

## 2021-01-17 RX ORDER — ACETAMINOPHEN 325 MG/1
650 TABLET ORAL
Status: DISCONTINUED | OUTPATIENT
Start: 2021-01-17 | End: 2021-01-21 | Stop reason: HOSPADM

## 2021-01-17 RX ORDER — SODIUM CHLORIDE 0.9 % (FLUSH) 0.9 %
5-40 SYRINGE (ML) INJECTION AS NEEDED
Status: DISCONTINUED | OUTPATIENT
Start: 2021-01-17 | End: 2021-01-21 | Stop reason: HOSPADM

## 2021-01-17 RX ORDER — NALOXONE HYDROCHLORIDE 0.4 MG/ML
0.2 INJECTION, SOLUTION INTRAMUSCULAR; INTRAVENOUS; SUBCUTANEOUS
Status: DISCONTINUED | OUTPATIENT
Start: 2021-01-17 | End: 2021-01-21 | Stop reason: HOSPADM

## 2021-01-17 RX ORDER — GABAPENTIN 300 MG/1
600 CAPSULE ORAL 3 TIMES DAILY
Status: DISCONTINUED | OUTPATIENT
Start: 2021-01-17 | End: 2021-01-21 | Stop reason: HOSPADM

## 2021-01-17 RX ORDER — MORPHINE SULFATE 2 MG/ML
4 INJECTION, SOLUTION INTRAMUSCULAR; INTRAVENOUS
Status: DISCONTINUED | OUTPATIENT
Start: 2021-01-17 | End: 2021-01-21 | Stop reason: HOSPADM

## 2021-01-17 RX ADMIN — GABAPENTIN 600 MG: 300 CAPSULE ORAL at 16:53

## 2021-01-17 RX ADMIN — MORPHINE SULFATE 4 MG: 2 INJECTION, SOLUTION INTRAMUSCULAR; INTRAVENOUS at 03:12

## 2021-01-17 RX ADMIN — DEXTROSE MONOHYDRATE, SODIUM CHLORIDE, AND POTASSIUM CHLORIDE 75 ML/HR: 50; 4.5; 1.49 INJECTION, SOLUTION INTRAVENOUS at 03:05

## 2021-01-17 RX ADMIN — GABAPENTIN 600 MG: 300 CAPSULE ORAL at 10:22

## 2021-01-17 RX ADMIN — ACETAMINOPHEN 650 MG: 325 TABLET ORAL at 20:27

## 2021-01-17 RX ADMIN — GABAPENTIN 600 MG: 300 CAPSULE ORAL at 21:30

## 2021-01-17 RX ADMIN — Medication 10 ML: at 13:43

## 2021-01-17 RX ADMIN — MORPHINE SULFATE 4 MG: 2 INJECTION, SOLUTION INTRAMUSCULAR; INTRAVENOUS at 13:42

## 2021-01-17 RX ADMIN — MORPHINE SULFATE 4 MG: 2 INJECTION, SOLUTION INTRAMUSCULAR; INTRAVENOUS at 18:32

## 2021-01-17 RX ADMIN — DEXTROSE MONOHYDRATE, SODIUM CHLORIDE, AND POTASSIUM CHLORIDE 75 ML/HR: 50; 4.5; 1.49 INJECTION, SOLUTION INTRAVENOUS at 18:50

## 2021-01-17 RX ADMIN — ESCITALOPRAM OXALATE 20 MG: 10 TABLET ORAL at 10:22

## 2021-01-17 RX ADMIN — POLYETHYLENE GLYCOL 3350, SODIUM SULFATE, SODIUM CHLORIDE, POTASSIUM CHLORIDE, ASCORBIC ACID, SODIUM ASCORBATE 2 L: KIT at 16:54

## 2021-01-17 RX ADMIN — MORPHINE SULFATE 4 MG: 2 INJECTION, SOLUTION INTRAMUSCULAR; INTRAVENOUS at 10:26

## 2021-01-17 RX ADMIN — FUROSEMIDE 20 MG: 40 TABLET ORAL at 10:22

## 2021-01-17 NOTE — PROGRESS NOTES
[address] [facility] Adult  Hospitalist Group                                                                                          Hospitalist Progress Note  Ashtyn Brewer MD  Answering service: [phone] from in house phone        Date of Service:  2021  NAME:  Lucius Clifton  :  1955  MRN:  [id number]      Admission Summary:   Lucius Clifton is a 77 y.o. 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 Interval history / Subjective:   Ongoing abdominal pain. Wife at bedside. Pt unable to speak after radiation therapy for head and neck cancer. They are concerned about his ability to tolerate GoLytley. Discussed that we will go slow. No episodes of vomiting since admission. Assessment & Plan:     Plan:   Redundant colon with anatomical displacement - no volvulus on repeat CT  -Clear liquids   -morphine IV for pain  - Golytely prep, with then colonoscopy per GI during this admission.   -hold home lactulose, and relastor that was being taken for abdominal bloating      Hx PE  -on coumdin due to PE in 2019, was on NOAC, but failed treatment due to progression of PE while on medication. 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 He the transitioned to argatro  -INR 1.2, hemoccult negative  - Hold coumadin for now.  Need to ask PCP or patient if this was provoked.      Hx oropharyngeal CA s/p chemoradiation  -can not vocalize due to recent radiation, also can not read or write per wife  -s/p J tube removal     Arachnoiditis  -uses morphine 60mg BID at home for chronic pain  -start morphine 4mg IV BID for abdominal and arachnoiditis pain  -continue gabapentin 600mg TID  -narcan     LE edema  -continue home lasix     Depression/Anxiety/Insomina  -continue lexapro  -hold ambien in the setting of IV morphine to prevent over sedation     Code status: FULL  DVT prophylaxis: SCDs    Care Plan discussed with: Patient/Family  Anticipated Disposition: Home w/Family  Anticipated Discharge: Greater than 48 hours     Hospital Problems  Date Reviewed: 6/14/2018          Codes Class Noted POA    Volvulus ([address] ICD-10-CM: Z59.1  ICD-9-CM: 560.2  1/17/2021 Unknown                Review of Systems:   A comprehensive review of systems was negative except for that written in the HPI. Vital Signs:    Last 24hrs VS reviewed since prior progress note. Most recent are:  Visit Vitals  /67   Pulse 86   Temp 98.2 °F (36.8 °C)   Resp 16   SpO2 93%         Intake/Output Summary (Last 24 hours) at 1/17/2021 1459  Last data filed at 1/17/2021 1355  Gross per 24 hour   Intake    Output 500 ml   Net -500 ml        Physical Examination:     I had a face to face encounter with this patient and independently examined them on 1/17/2021 as outlined below:          Constitutional:  No acute distress, cooperative, pleasant, obese   ENT: MMM   Resp:  CTA bilaterally. No wheezing/rhonchi/rales. No accessory muscle use   CV:  Regular rhythm, normal rate, no murmurs, gallops, rubs    GI:  Soft, + distended, Tender to deep palpation. Hypoactive bowel sounds, no hepatosplenomegaly     Musculoskeletal:  No edema, warm, 2+ pulses throughout    Neurologic:  Moves all extremities. Following commands. Skin:  Good turgor, no rashes or ulcers       Data Review:    Review and/or order of clinical lab test  Review and/or order of tests in the radiology section of CPT  Review and/or order of tests in the medicine section of CPT      Labs:     Recent Labs     01/16/21  1936   WBC 9.0   HGB 14.8   HCT 44.2   PLT UNABLE TO REPORT ACCURATE COUNT DUE TO PLATELET AGGREGATION, HOWEVER, PLATELETS APPEAR NORMAL IN NUMBER ON SMEAR.   PLEASE RESUBMIT SODIUM CITRATE (BLUE) AND EDTA (LAVENDER) TUBES FOR HEMATOLOGICAL TESTING. Recent Labs     01/17/21  0410 01/16/21 1936    137   K 3.5 3.6    102   CO2 26 28   BUN 9 9   CREA 0.91 1.24   * 95   CA 8.4* 8.8   MG 2.1  --      Recent Labs     01/16/21 1936   ALT 16   *   TBILI 0.6   TP 8.3*   ALB 3.9   GLOB 4.4*   LPSE 84     Recent Labs     01/16/21 1936   INR 1.2*   PTP 12.2*      No results for input(s): FE, TIBC, PSAT, FERR in the last 72 hours. No results found for: FOL, RBCF   No results for input(s): PH, PCO2, PO2 in the last 72 hours. No results for input(s): CPK, CKNDX, TROIQ in the last 72 hours.     No lab exists for component: CPKMB  No results found for: CHOL, CHOLX, CHLST, CHOLV, HDL, HDLP, LDL, LDLC, DLDLP, TGLX, TRIGL, TRIGP, CHHD, CHHDX  No results found for: GLUCPOC  No results found for: COLOR, APPRN, SPGRU, REFSG, GUSTAVO, PROTU, GLUCU, KETU, BILU, UROU, VIRY, LEUKU, GLUKE, EPSU, BACTU, WBCU, RBCU, CASTS, UCRY      Medications Reviewed:     Current Facility-Administered Medications   Medication Dose Route Frequency    sodium chloride (NS) flush 5-40 mL  5-40 mL IntraVENous Q8H    sodium chloride (NS) flush 5-40 mL  5-40 mL IntraVENous PRN    dextrose 5% - 0.45% NaCl with KCl 20 mEq/L infusion  75 mL/hr IntraVENous CONTINUOUS    naloxone (NARCAN) injection 0.2 mg  0.2 mg SubCUTAneous EVERY 2 MINUTES AS NEEDED    gabapentin (NEURONTIN) capsule 600 mg  600 mg Oral TID    escitalopram oxalate (LEXAPRO) tablet 20 mg  20 mg Oral DAILY    furosemide (LASIX) tablet 20 mg  20 mg Oral DAILY    morphine injection 4 mg  4 mg IntraVENous Q3H PRN    peg 3350-Electrolytes-Vit C (MOVIPREP) oral solution 2 L  2 L Oral ONCE    ondansetron (ZOFRAN) injection 4 mg  4 mg IntraVENous Q8H PRN    prochlorperazine (COMPAZINE) with saline injection 5 mg  5 mg IntraVENous Q4H PRN     ______________________________________________________________________  EXPECTED LENGTH OF STAY: - - -  ACTUAL LENGTH OF STAY:          0                 Bee Tipton MD

## 2021-01-17 NOTE — H&P
[address] [facility] Adult  Hospitalist Group  History and Physical    Primary Care Provider: Anjelica Ely MD  Date of Service:  1/17/2021    Subjective:     Booker Ybarra is a 77 y.o. 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 Abdominal pain acutely worsened today which prompted him to come to the hospital for evaluation. In the ED, VSS. CT abdomen/pelvis showed findings concerning for sigmoid volvuvlus. CBC, CMP, and lipase were unremarkable. Hemoccult was negative. Review of Systems:    A comprehensive review of systems was negative except for that written in the History of Present Illness. Past Medical History:   Diagnosis Date    Asthma 2017    Hypertension     Other ill-defined conditions(799.89)     kidney stones      Past Surgical History:   Procedure Laterality Date    HX APPENDECTOMY      HX BACK SURGERY      L5 - S1    HX CHOLECYSTECTOMY       Prior to Admission medications    Medication Sig Start Date End Date Taking? Authorizing Provider   predniSONE (DELTASONE) 20 mg tablet 2 qd for 4 d. 1 1/2 qd for 4 d. 1 qd for 4 d, 1/2 qd for 4 d. 8/10/17   Chrissie Urrutia MD   gabapentin (NEURONTIN) 600 mg tablet Take 600 mg by mouth three (3) times daily. Provider, Historical   zolpidem (AMBIEN) 10 mg tablet Take 10 mg by mouth nightly as needed for Sleep. Provider, Historical   escitalopram (LEXAPRO) 10 mg tablet Take 10 mg by mouth daily. Provider, Historical   Morphine (RENY) 60 mg ER capsule Take 60 mg by mouth three (3) times daily. Provider, Historical   clonazePAM (KLONOPIN) 1 mg tablet Take 1 mg by mouth two (2) times a day.     Provider, Historical   furosemide (LASIX) 20 mg tablet Take 20 mg by mouth two (2) times a day.    Provider, Historical   potassium chloride (KLOR-CON M20) 20 mEq tablet Take 20 mEq by mouth as needed for Other (when taking fluid). Provider, Historical   triamterene-hydrochlorothiazide (MAXZIDE-25MG) 37.5-25 mg per tablet Take 1 Tab by mouth daily. Provider, Historical   HYDROcodone-acetaminophen (NORCO) 5-325 mg per tablet 1-2 tabs PO Q 4-6 hrs PRN 1/29/14   ZINA Watson     Allergies   Allergen Reactions    Heparin Other (comments)     Heparin induced thrombocytopenia      History reviewed. No pertinent family history. SOCIAL HISTORY:  Patient resides at Home. Patient ambulates with cane, has a walker, but does not use it. Smoking history: never  Alcohol history: none currently, quit 35 years ago      Objective:       Physical Exam:   Visit Vitals  /68 (BP 1 Location: Left arm, BP Patient Position: At rest)   Pulse 73   Temp 99.1 °F (37.3 °C)   Resp 18   SpO2 98%     General:  Alert, cooperative, no distress, appears stated age. Head:  Normocephalic, without obvious abnormality, atraumatic. Eyes:  Conjunctivae/corneas clear. EOMs intact. Throat: Lips, mucosa, and tongue normal. Edentulous   Neck: Supple, symmetrical, trachea midline   Back:   Symmetric, no curvature. ROM normal. .   Lungs:   Clear to auscultation bilaterally. Chest wall:  No tenderness or deformity. Heart:  Regular rate and rhythm, S1, S2 normal, no murmur, click, rub or gallop. Abdomen:   Soft with well healed J peg scar LUQ, midline scar above umbilicus with protrusion of bowel that is soft. Some tenderness to palpation of abdomen in b/l lower quadrant. No rebound or gaurding. Bowel sounds normal. No masses,           Extremities: Extremities normal, atraumatic, no cyanosis. Trace b/l LE edeam   Pulses: 2+ radial pulses   Skin: Skin color, texture, turgor normal. No rashes or lesions         ECG:  Ordered, pending    Data Review:    All diagnostic labs and studies have been reviewed. Assessment:     Active Problems:    Volvulus ([address] (1/17/2021)        Plan:     #Possible Volvulus with diarrhea  -NPO  -maintenance IVF D5 1/2 with K+  -morphine IV for pain  -rectal tube per gastroenteroology  -gastroenterology consulted for poss reduction tomorrow (Dr. Alida Nunez)  -repeat CT abd/pelvis in the AM  -hold home lactulose, and relastor that was being taken for abdominal bloating     #Hx PE  -on coumdin due to PE in 2019, was on NOAC, but failed treatment due to progression of PE while on medication. 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 He the transitioned to argatro  -INR 1.2, hemoccult negative  -hold home coumadin pending poss procedure, SCD    #Hx oropharyngeal CA s/p chemoradiation  -can not vocalize due to recent radiation, also can not read or write per wife  -s/p J tube removal    #Arachnoiditis  -uses morphine 60mg BID at home for chronic pain  -start morphine 4mg IV BID for abdominal and arachnoiditis pain  -continue gabapentin 600mg TID  -narcan    #LE edema  -continue home lasix    #Depression/Anxiety/Insomina  -continue lexapro  -hold ambien in the setting of IV morphine to prevent over sedation    DVT ppx: SCD, pending poss procedure, and Allergy to Heparin/HIT  MPOA: Adelina Richardson (wife)>[phone]  Code: Full    FUNCTIONAL STATUS PRIOR TO HOSPITALIZATION Ambulatory with Use of Assistive Devices (including history of recent falls): Per wife, hx of many falls at home.      Signed By: Ryan Treadwell MD     January 17, 2021

## 2021-01-17 NOTE — ED NOTES
11:50 AM  Change of shift. Care of patient taken over from Dr. Leola Green; H&P reviewed, handoff complete. Awaiting return of GI callback. Shaila Carmona MD    Consult note: Dr. Jorge Jacobs (GI) -recommends n.p.o. status, rectal tube. He will order repeat CT scan for 6 AM and will see later in the morning.   Shaila Carmona MD  12:22 AM

## 2021-01-17 NOTE — ED NOTES
MD Lisseth Newsome at bedside for rectal exam. Attempted to collect labs for platelet count x2 without success. Per MD, additional labs not needed at this time.

## 2021-01-17 NOTE — PROGRESS NOTES
Bedside shift change report given to Amita Manzano RN (oncoming nurse) by Veronica Sheppard RN (offgoing nurse). Report included the following information SBAR, Kardex, Intake/Output, MAR and Recent Results.

## 2021-01-17 NOTE — ROUTINE PROCESS
TRANSFER - OUT REPORT: 
 
Verbal report given to Staff RN (name) on Shelly Craig  being transferred to [facility] (unit) for routine progression of care Report consisted of patients Situation, Background, Assessment and  
Recommendations(SBAR). Information from the following report(s) SBAR, ED Summary, Intake/Output, MAR and Recent Results was reviewed with the receiving nurse. Lines:    
 
Opportunity for questions and clarification was provided. Patient transported with: 
 Registered Nurse Visit Vitals /72 Pulse 82 Temp 97.8 °F (36.6 °C) Resp 18 SpO2 97%

## 2021-01-17 NOTE — ED NOTES
Verbal shift change report given to Ailyn Bennett RN (oncoming nurse) by Zion Warner (offgoing nurse). Report included the following information SBAR, ED Summary, Procedure Summary, Intake/Output, MAR and Recent Results.

## 2021-01-17 NOTE — ED PROVIDER NOTES
Roger Armenta is a 77 y.o. 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 Over the past 3 days he has experienced intermittent abdominal pain which last for a minute or 2 and in between has minimal discomfort. States that he does not fall cold, lateralized. Denies vomiting but had some nausea. Has had increased stool which has been soft but not timoteo diarrhea or watery. Noted that the stool was dark/black. Past Medical History:   Diagnosis Date    Asthma 2017    Hypertension     Other ill-defined conditions(799.89)     kidney stones       Past Surgical History:   Procedure Laterality Date    HX APPENDECTOMY      HX BACK SURGERY      L5 - S1    HX CHOLECYSTECTOMY           History reviewed. No pertinent family history.     Social History     Socioeconomic History    Marital status:      Spouse name: Not on file    Number of children: Not on file    Years of education: Not on file    Highest education level: Not on file   Occupational History    Not on file   Social Needs    Financial resource strain: Not on file    Food insecurity     Worry: Not on file     Inability: Not on file    Transportation needs     Medical: Not on file     Non-medical: Not on file   Tobacco Use    Smoking status: Never Smoker    Smokeless tobacco: Never Used   Substance and Sexual Activity    Alcohol use: No    Drug use: No    Sexual activity: Not on file   Lifestyle    Physical activity     Days per week: Not on file     Minutes per session: Not on file    Stress: Not on file   Relationships    Social connections     Talks on phone: Not on file     Gets together: Not on file     Attends [employer] service: Not on file     Active member of club or organization: Not on file     Attends meetings of clubs or organizations: Not on file     Relationship status: Not on file    Intimate partner violence     Fear of current or ex partner: Not on file     Emotionally abused: Not on file     Physically abused: Not on file     Forced sexual activity: Not on file   Other Topics Concern    Not on file   Social History Narrative    Not on file         ALLERGIES: Heparin    Review of Systems   Constitutional: Negative for chills, fatigue and fever. HENT: Negative for ear pain, sore throat and trouble swallowing. Eyes: Negative for visual disturbance. Respiratory: Negative for cough and shortness of breath. Cardiovascular: Negative for chest pain. Gastrointestinal: Negative for abdominal pain, nausea and vomiting. Genitourinary: Negative for dysuria. Musculoskeletal: Negative for back pain. Skin: Negative for rash. Neurological: Negative for seizures, light-headedness and headaches. Psychiatric/Behavioral: Negative for confusion. All other systems reviewed and are negative. Vitals:    01/16/21 1848   BP: (!) 153/81   Pulse: 82   Resp: 18   Temp: 97.8 °F (36.6 °C)   SpO2: 97%            Physical Exam  Vitals signs reviewed. Constitutional:       General: He is not in acute distress. HENT:      Head: Normocephalic and atraumatic. Mouth/Throat:      Mouth: Mucous membranes are moist.      Pharynx: Oropharynx is clear. Cardiovascular:      Rate and Rhythm: Normal rate and regular rhythm. Heart sounds: Normal heart sounds. Pulmonary:      Effort: Pulmonary effort is normal.      Breath sounds: Normal breath sounds. Abdominal:      General: There is distension (mildly ). Tenderness: There is no abdominal tenderness. There is no guarding or rebound. Genitourinary:     Penis: Normal.       Testes: Normal.       Musculoskeletal: Normal range of motion. Right lower leg: No edema. Left lower leg: No edema. Skin:     General: Skin is warm and dry.       Capillary Refill: Capillary refill takes less than 2 seconds. Neurological:      General: No focal deficit present. Mental Status: He is alert. Comments: Unable to test phonation as patient unable to speak following treatment for oropharyngeal cancer. Psychiatric:         Mood and Affect: Mood normal.          MDM     77 y.o. gentleman with history of appendectomy, no other abdominal surgical history presenting with diffuse abdominal pain, peristaltic in its frequency  o Concern for possible GI bleed, no stool is noted in the vault. Hemoglobin is stable  o He is subtherapeutic on Coumadin, unclear if it is even indicated given that he had a DVT while he had active cancer and this was over a year ago  o He has not had this pattern of pain however in the past, he appears slightly distended and is tender in the right lower quadrant  o Plan to obtain CT, labs, administer analgesics and reassess patient. 11:07 PM         ________________________________________    11:46 PM   Patient reevaluated: Pain significantly improved with morphine. No further bowel movements. CT reveals likely sigmoid volvulus. Will consult GI, admit to hospitalist for further management. Does not have peritonitis on repeat abdominal exam as well. Heaven Shaw MD   ________________________________________   Perfect Serve Consult for Admission  11:46 PM    ED Room Number: ER05/05  Patient Name and age:  Lieutenant Sethi 77 y.o.  male  Working Diagnosis:   1. Sigmoid volvulus ([address]        COVID-19 Suspicion:  no  Sepsis present:  no  Reassessment needed: no  Code Status:  Full Code  Readmission: no  Isolation Requirements:  no  Recommended Level of Care:  med/surg  Department:[facility] Adult ED - 21   Other:      1    Procedures        12:37 AM     I have spent 77 minutes of critical care time involved in lab review, consultations with specialist, family decision-making, and documentation.   During this entire length of time I was immediately available to the patient and/or family.     Danuta Nicole MD

## 2021-01-17 NOTE — PROGRESS NOTES
Pt IV infiltrated, pt refused another IV at this time. Pt explained he has been stuck 9 times for IV placement, RN explained he has the right to refuse. [id number]-RN called by auto test who stated that the pt's CBC clotted.

## 2021-01-17 NOTE — PROGRESS NOTES
On Call GI Note:    Called by ER given CT findings concerning for volvulus. Discussed case in detail with ER attending. Pt with non-specific intermittent AP x 3 days without n/v. On exam abdomen is protuberant but non-distended (ER doctor confirmed with family). Pt has actually been having increased soft BM's without diarrhea with concern for possible melena (I.e. not constipated). Clinical presentation is NOT c/w volvulus. I personally reviewed CT with Radiology and findings suggestive of volvulus but pt does not have pathognomic whirl pattern.  Given that combined with clinical presentation I do not feel urgent Flexible Sigmoidoscopy is needed at this time    Recommend:  - NPO  - IVF  - Rectal tube  - repeat CT at 6 am (will order)    Please call GI if pt has n/v, change in physical exam in interim    Signed By: Beth Hernandez MD     January 17, 2021

## 2021-01-17 NOTE — ROUTINE PROCESS
10:45 am: Bowel decompression started and finished at 11:00 am. Patient went down to CT at 11:30 am.  
 
12:19 pm:  Patient back from CT after bowel decompression. Here are the results: Redundancy of the sigmoid colon is extensive. There is a significant degree of fecal stasis. Nonobstructive renal calculi more in the lower pole of the right than the left kidney. Status post cholecystectomy. Dr. Johnie Guevara is aware and would like gastro to know.

## 2021-01-17 NOTE — CONSULTS
[address], [address] [place] [address]       GASTROENTEROLOGY CONSULTATION NOTE        NAME:  Stefania Hilliard   :   1955   MRN:   [id number]           Consult Date: 2021 12:15 PM      History of Present Illness:  Patient is a 77 y.o. who is seen in consultation at the request of Dr. Che Oseguera for suspected sigmoid volvulus. He has a PMH as listed below. He also has a history of oropharyngeal cancer per his report, for which he remotely had treatment with chemotherapy and radiation therapy. This was at [facility] and records are not available for my review. He was seen in 2018 by as an outpatient for medical management of constipation. As part of his work up, a colonoscopy was planned (also for follow up of colon polyp removed in ). The colonoscopy could not be completed as he had not had any bowel movements despite completing his preparation. The procedure was cancelled that day and he was lost to follow up thereafter. He presented to ED yesterday with intermittent abdominal pain over the past few weeks that acutely worsened yesterday. In ED, his work up was significant for CT concerning for sigmoid volvulus. Per overnight team, plan was to repeat CT today following rectal decompression as clinical picture was not consistent with sigmoid volvulus and pathognomonic findings of sigmoid volvulus were not seen on initial CT when re-reviewed with radiology. He reports some clinical improvement following rectal decompression this morning. Repeat CT this morning shows a non-distended and displaced sigmoid colon overriding anterior mid and upper abdomen. Redundancy of the large bowel is extensive. Fecal stasis is severe. No proximal colonic or small bowel distention. He is on opiates at baseline for back pain.        PMH:  Past Medical History:   Diagnosis Date    Asthma 2017    Hypertension     Other ill-defined conditions(456.70)     kidney stones       PSH:  Past Surgical History:   Procedure Laterality Date    HX APPENDECTOMY      HX BACK SURGERY      L5 - S1    HX CHOLECYSTECTOMY         Allergies: Allergies   Allergen Reactions    Heparin Other (comments)     Heparin induced thrombocytopenia       Home Medications:  Prior to Admission Medications   Prescriptions Last Dose Informant Patient Reported? Taking? HYDROcodone-acetaminophen (NORCO) 5-325 mg per tablet   No No   Si-2 tabs PO Q 4-6 hrs PRN   Morphine (RENY) 60 mg ER capsule  Significant Other Yes No   Sig: Take 60 mg by mouth three (3) times daily. clonazePAM (KLONOPIN) 1 mg tablet  Significant Other Yes No   Sig: Take 1 mg by mouth two (2) times a day. escitalopram (LEXAPRO) 10 mg tablet  Significant Other Yes No   Sig: Take 10 mg by mouth daily. furosemide (LASIX) 20 mg tablet  Significant Other Yes No   Sig: Take 20 mg by mouth two (2) times a day.   gabapentin (NEURONTIN) 600 mg tablet  Significant Other Yes No   Sig: Take 600 mg by mouth three (3) times daily. potassium chloride (KLOR-CON M20) 20 mEq tablet  Significant Other Yes No   Sig: Take 20 mEq by mouth as needed for Other (when taking fluid). predniSONE (DELTASONE) 20 mg tablet   No No   Si qd for 4 d. 1 1/2 qd for 4 d. 1 qd for 4 d, 1/2 qd for 4 d.   triamterene-hydrochlorothiazide (MAXZIDE-25MG) 37.5-25 mg per tablet  Significant Other Yes No   Sig: Take 1 Tab by mouth daily. zolpidem (AMBIEN) 10 mg tablet  Significant Other Yes No   Sig: Take 10 mg by mouth nightly as needed for Sleep.       Facility-Administered Medications: None       Hospital Medications:  Current Facility-Administered Medications   Medication Dose Route Frequency    sodium chloride (NS) flush 5-40 mL  5-40 mL IntraVENous Q8H    sodium chloride (NS) flush 5-40 mL  5-40 mL IntraVENous PRN    dextrose 5% - 0.45% NaCl with KCl 20 mEq/L infusion  75 mL/hr IntraVENous CONTINUOUS    naloxone (NARCAN) injection 0.2 mg  0.2 mg SubCUTAneous EVERY 2 MINUTES AS NEEDED    gabapentin (NEURONTIN) capsule 600 mg  600 mg Oral TID    escitalopram oxalate (LEXAPRO) tablet 20 mg  20 mg Oral DAILY    furosemide (LASIX) tablet 20 mg  20 mg Oral DAILY    morphine injection 4 mg  4 mg IntraVENous Q3H PRN       Social History:  Social History     Tobacco Use    Smoking status: Never Smoker    Smokeless tobacco: Never Used   Substance Use Topics    Alcohol use: No       Family History:  History reviewed. No pertinent family history. Review of Systems:  Constitutional: negative fever, negative chills, negative weight loss  Eyes:   negative visual changes  ENT:   negative sore throat, tongue or lip swelling  Respiratory:  negative cough, negative dyspnea  Cards:  negative for chest pain, palpitations, lower extremity edema  GI:   See HPI  :  negative for frequency, dysuria  Integument:  negative for rash and pruritus  Heme:  negative for easy bruising and gum/nose bleeding  Musculoskel: negative for myalgias,  back pain and muscle weakness  Neuro: negative for headaches, dizziness, vertigo  Psych:  negative for feelings of anxiety, depression     Objective:     Patient Vitals for the past 8 hrs:   BP Temp Pulse Resp SpO2   01/17/21 0848 126/67 98.2 °F (36.8 °C) 86 16 93 %     No intake/output data recorded. No intake/output data recorded. PHYSICAL EXAM:  General: WD, WN. Alert, cooperative, no acute distress    HEENT: NC, Atraumatic. PERRLA, EOMI. Anicteric sclerae. Lungs:  CTA Bilaterally. No Wheezing/Rhonchi/Rales. Heart:  Regular  rhythm,  No murmur (), No Rubs, No Gallops  Abdomen: Soft, Non distended, TTP RLQ and LLQ, +Bowel sounds, no HSM  Extremities: No c/c/e  Neurologic:  CN 2-12 gi, Alert and oriented X 3. No acute neurological distress   Psych:   Good insight. Not anxious nor agitated.      Data Review     Recent Labs     01/16/21  1936   WBC 9.0   HGB 14.8   HCT 44.2   PLT UNABLE TO REPORT ACCURATE COUNT DUE TO PLATELET AGGREGATION, HOWEVER, PLATELETS APPEAR NORMAL IN NUMBER ON SMEAR. PLEASE RESUBMIT SODIUM CITRATE (BLUE) AND EDTA (LAVENDER) TUBES FOR HEMATOLOGICAL TESTING. Recent Labs     01/17/21  0410 01/16/21 1936    137   K 3.5 3.6    102   CO2 26 28   BUN 9 9   CREA 0.91 1.24   * 95   CA 8.4* 8.8     Recent Labs     01/16/21 1936   *   TP 8.3*   ALB 3.9   GLOB 4.4*   LPSE 84     Recent Labs     01/16/21 1936   INR 1.2*   PTP 12.2*       Radiology Review    As above       Assessment:   · Bilateral lower quadrant abdominal pain and CT findings that now appear to be consistent with a redundant colon with anatomical displacement as opposed to sigmoid volvulus  · Fecal stasis  · Remote history of colon polyps and no colonoscopy since  · Isolated alkaline phosphatase elevation     Plan:   · Clear liquid diet   · Would favor diagnostic colonoscopy during this hospitalization. Given his history of unsuccessful preparation in the past, would favor a two day preparation. Will start preparation today to see his response  · Monitor LFT's  · Will follow along with you. Dr. Bandar Boudreaux to follow from our team on 1/18/21. Signed By: Symone Krishna.  Meera Vital MD     1/17/2021  12:15 PM

## 2021-01-18 PROBLEM — R10.9 ABDOMINAL PAIN: Status: ACTIVE | Noted: 2021-01-18

## 2021-01-18 LAB
ANION GAP SERPL CALC-SCNC: 4 MMOL/L (ref 5–15)
BASOPHILS # BLD: 0.1 K/UL (ref 0–0.1)
BASOPHILS NFR BLD: 1 % (ref 0–1)
BUN SERPL-MCNC: 8 MG/DL (ref 6–20)
BUN/CREAT SERPL: 9 (ref 12–20)
CALCIUM SERPL-MCNC: 8.6 MG/DL (ref 8.5–10.1)
CHLORIDE SERPL-SCNC: 104 MMOL/L (ref 97–108)
CO2 SERPL-SCNC: 30 MMOL/L (ref 21–32)
COVID-19 RAPID TEST, COVR: NOT DETECTED
CREAT SERPL-MCNC: 0.89 MG/DL (ref 0.7–1.3)
DIFFERENTIAL METHOD BLD: ABNORMAL
EOSINOPHIL # BLD: 0.3 K/UL (ref 0–0.4)
EOSINOPHIL NFR BLD: 5 % (ref 0–7)
ERYTHROCYTE [DISTWIDTH] IN BLOOD BY AUTOMATED COUNT: 13.9 % (ref 11.5–14.5)
GLUCOSE SERPL-MCNC: 118 MG/DL (ref 65–100)
HCT VFR BLD AUTO: 41 % (ref 36.6–50.3)
HGB BLD-MCNC: 13.8 G/DL (ref 12.1–17)
IMM GRANULOCYTES # BLD AUTO: 0.1 K/UL (ref 0–0.04)
IMM GRANULOCYTES NFR BLD AUTO: 1 % (ref 0–0.5)
LYMPHOCYTES # BLD: 1 K/UL (ref 0.8–3.5)
LYMPHOCYTES NFR BLD: 16 % (ref 12–49)
MAGNESIUM SERPL-MCNC: 2.2 MG/DL (ref 1.6–2.4)
MCH RBC QN AUTO: 30.3 PG (ref 26–34)
MCHC RBC AUTO-ENTMCNC: 33.7 G/DL (ref 30–36.5)
MCV RBC AUTO: 90.1 FL (ref 80–99)
MONOCYTES # BLD: 0.7 K/UL (ref 0–1)
MONOCYTES NFR BLD: 11 % (ref 5–13)
NEUTS SEG # BLD: 3.9 K/UL (ref 1.8–8)
NEUTS SEG NFR BLD: 66 % (ref 32–75)
NRBC # BLD: 0 K/UL (ref 0–0.01)
NRBC BLD-RTO: 0 PER 100 WBC
PHOSPHATE SERPL-MCNC: 3.1 MG/DL (ref 2.6–4.7)
PLATELET # BLD AUTO: 241 K/UL (ref 150–400)
PMV BLD AUTO: 9.6 FL (ref 8.9–12.9)
POTASSIUM SERPL-SCNC: 3.8 MMOL/L (ref 3.5–5.1)
RBC # BLD AUTO: 4.55 M/UL (ref 4.1–5.7)
RBC MORPH BLD: ABNORMAL
SODIUM SERPL-SCNC: 138 MMOL/L (ref 136–145)
SOURCE, COVRS: NORMAL
SPECIMEN SOURCE, FCOV2M: NORMAL
SPECIMEN TYPE, XMCV1T: NORMAL
WBC # BLD AUTO: 6.1 K/UL (ref 4.1–11.1)

## 2021-01-18 PROCEDURE — 74011250636 HC RX REV CODE- 250/636: Performed by: FAMILY MEDICINE

## 2021-01-18 PROCEDURE — 74011000250 HC RX REV CODE- 250: Performed by: NURSE PRACTITIONER

## 2021-01-18 PROCEDURE — 94760 N-INVAS EAR/PLS OXIMETRY 1: CPT

## 2021-01-18 PROCEDURE — 74011250637 HC RX REV CODE- 250/637: Performed by: NURSE PRACTITIONER

## 2021-01-18 PROCEDURE — 99218 HC RM OBSERVATION: CPT

## 2021-01-18 PROCEDURE — 80048 BASIC METABOLIC PNL TOTAL CA: CPT

## 2021-01-18 PROCEDURE — 96376 TX/PRO/DX INJ SAME DRUG ADON: CPT

## 2021-01-18 PROCEDURE — 85025 COMPLETE CBC W/AUTO DIFF WBC: CPT

## 2021-01-18 PROCEDURE — 87635 SARS-COV-2 COVID-19 AMP PRB: CPT

## 2021-01-18 PROCEDURE — 84100 ASSAY OF PHOSPHORUS: CPT

## 2021-01-18 PROCEDURE — 74011250636 HC RX REV CODE- 250/636: Performed by: INTERNAL MEDICINE

## 2021-01-18 PROCEDURE — 83735 ASSAY OF MAGNESIUM: CPT

## 2021-01-18 PROCEDURE — 36415 COLL VENOUS BLD VENIPUNCTURE: CPT

## 2021-01-18 PROCEDURE — 74011250637 HC RX REV CODE- 250/637: Performed by: FAMILY MEDICINE

## 2021-01-18 PROCEDURE — 65270000029 HC RM PRIVATE

## 2021-01-18 RX ORDER — POLYETHYLENE GLYCOL 3350, SODIUM SULFATE, SODIUM CHLORIDE, POTASSIUM CHLORIDE, SODIUM ASCORBATE, AND ASCORBIC ACID 7.5-2.691G
1 KIT ORAL 2 TIMES DAILY
Status: COMPLETED | OUTPATIENT
Start: 2021-01-18 | End: 2021-01-19

## 2021-01-18 RX ADMIN — MORPHINE SULFATE 4 MG: 2 INJECTION, SOLUTION INTRAMUSCULAR; INTRAVENOUS at 00:49

## 2021-01-18 RX ADMIN — MORPHINE SULFATE 4 MG: 2 INJECTION, SOLUTION INTRAMUSCULAR; INTRAVENOUS at 10:12

## 2021-01-18 RX ADMIN — SODIUM PHOSPHATE, DIBASIC AND SODIUM PHOSPHATE, MONOBASIC 1 ENEMA: 7; 19 ENEMA RECTAL at 12:39

## 2021-01-18 RX ADMIN — DEXTROSE MONOHYDRATE, SODIUM CHLORIDE, AND POTASSIUM CHLORIDE 75 ML/HR: 50; 4.5; 1.49 INJECTION, SOLUTION INTRAVENOUS at 07:38

## 2021-01-18 RX ADMIN — GABAPENTIN 600 MG: 300 CAPSULE ORAL at 21:12

## 2021-01-18 RX ADMIN — DEXTROSE MONOHYDRATE, SODIUM CHLORIDE, AND POTASSIUM CHLORIDE 75 ML/HR: 50; 4.5; 1.49 INJECTION, SOLUTION INTRAVENOUS at 23:06

## 2021-01-18 RX ADMIN — MORPHINE SULFATE 4 MG: 2 INJECTION, SOLUTION INTRAMUSCULAR; INTRAVENOUS at 06:14

## 2021-01-18 RX ADMIN — FUROSEMIDE 20 MG: 40 TABLET ORAL at 10:08

## 2021-01-18 RX ADMIN — GABAPENTIN 600 MG: 300 CAPSULE ORAL at 10:08

## 2021-01-18 RX ADMIN — GABAPENTIN 600 MG: 300 CAPSULE ORAL at 16:35

## 2021-01-18 RX ADMIN — ESCITALOPRAM OXALATE 20 MG: 10 TABLET ORAL at 10:08

## 2021-01-18 RX ADMIN — MORPHINE SULFATE 4 MG: 2 INJECTION, SOLUTION INTRAMUSCULAR; INTRAVENOUS at 21:12

## 2021-01-18 RX ADMIN — MORPHINE SULFATE 4 MG: 2 INJECTION, SOLUTION INTRAMUSCULAR; INTRAVENOUS at 17:34

## 2021-01-18 RX ADMIN — POLYETHYLENE GLYCOL 3350, SODIUM SULFATE, SODIUM CHLORIDE, POTASSIUM CHLORIDE, ASCORBIC ACID, SODIUM ASCORBATE 1 L: KIT at 17:35

## 2021-01-18 NOTE — PROGRESS NOTES
Transitions of Care plan: Home with wife and personal care once stable    Reason for Admission:   Redundant colon with anatomical displacement                    RUR Score:   12%                  Plan for utilizing home health:   Most likely will not be needed       PCP: First and Last name:  Melly Dodd   Name of Practice:    Are you a current patient: Yes/No: yes   Approximate date of last visit: 6 months   Can you participate in a virtual visit with your PCP: yes                    Current Advanced Directive/Advance Care Plan: none, is a FULL CODE                         Transition of Care Plan:  CM met with patient at the bedside to explain role and offer support. Patient is alert and oriented x4, confirmed demographics. He lives with his wife and has private duty care Monday-Friday from 10a-4pm. No home health or DME at this time. CM will be available if any needs arise. Care Management Interventions  PCP Verified by CM: Yes  Palliative Care Criteria Met (RRAT>21 & CHF Dx)?: No  MyChart Signup: No  Discharge Durable Medical Equipment: No  Health Maintenance Reviewed: Yes  Physical Therapy Consult: No  Occupational Therapy Consult: No  Speech Therapy Consult: No  Current Support Network: Lives with Spouse, Has Personal Caregivers  Confirm Follow Up Transport: Family  Discharge Location  Discharge Placement: Home    S.  [employer] , [place]

## 2021-01-18 NOTE — PROGRESS NOTES
Bedside shift change report given to Sarah Caceres (oncoming nurse) by Zaheer Ponce (offgoing nurse). Report included the following information SBAR, Kardex and MAR.

## 2021-01-18 NOTE — PROGRESS NOTES
[address] [facility] Adult  Hospitalist Group                                                                                          Hospitalist Progress Note  Danish Valenzuela MD  Answering service: [phone] from in house phone        Date of Service:  2021  NAME:  Lisa Lynn  :  1955  MRN:  [id number]      Admission Summary:   Lisa Lynn is a 77 y.o. 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 Interval history / Subjective:   Complaining of abdominal bloating. Has completed about 1/2 prep without a bowel movement. About to get an enema now. Assessment & Plan:     Plan:   Redundant colon with anatomical displacement - no volvulus on repeat CT  -Clear liquids   -morphine IV for pain  - Golytely prep, with colonoscopy planned for tomorrow  - Enema now   - hold home lactulose, and relastor that was being taken for abdominal bloating      Hx PE  -on coumdin due to PE in 2019, was on NOAC, but failed treatment due to progression of PE while on medication. 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 He the transitioned to argatro  -INR 1.2, hemoccult negative  - Hold coumadin for now.  Need to ask PCP or patient if this was provoked.      Hx oropharyngeal CA s/p chemoradiation  -has trouble speaking due to radiation, though was better today, also can not read or write per wife  -s/p J tube removal     Arachnoiditis  -uses morphine 60mg BID at home for chronic pain  -start morphine 4mg IV BID for abdominal and arachnoiditis pain  -continue gabapentin 600mg TID  -narcan     LE edema  -continue home lasix     Depression/Anxiety/Insomina  -continue lexapro  -hold ambien in the setting of IV morphine to prevent over sedation     Code status: FULL  DVT prophylaxis: SCDs    Care Plan discussed with: Patient/Family  Anticipated Disposition: Home w/Family  Anticipated Discharge: 24 hours to 48 hours     Hospital Problems  Date Reviewed: 6/14/2018          Codes Class Noted POA    Abdominal pain ICD-10-CM: R10.9  ICD-9-CM: 789.00  1/18/2021 Unknown        Volvulus ([address] ICD-10-CM: K56.2  ICD-9-CM: 560.2  1/17/2021 Unknown                Review of Systems:   A comprehensive review of systems was negative except for that written in the HPI. Vital Signs:    Last 24hrs VS reviewed since prior progress note. Most recent are:  Visit Vitals  /75   Pulse 79   Temp 98.4 °F (36.9 °C)   Resp 16   SpO2 94%         Intake/Output Summary (Last 24 hours) at 1/18/2021 1624  Last data filed at 1/18/2021 5379  Gross per 24 hour   Intake    Output 350 ml   Net -350 ml        Physical Examination:     I had a face to face encounter with this patient and independently examined them on 1/18/2021 as outlined below:          Constitutional:  No acute distress, cooperative, pleasant, obese   ENT: MMM   Resp:  CTA bilaterally. No wheezing/rhonchi/rales. No accessory muscle use   CV:  Regular rhythm, normal rate, no murmurs, gallops, rubs    GI:  Soft, + distended, Tender to deep palpation. +active bowel sounds, no hepatosplenomegaly     Musculoskeletal:  No edema, warm, 2+ pulses throughout    Neurologic:  Moves all extremities. Following commands. Skin:  Good turgor, no rashes or ulcers       Data Review:    Review and/or order of clinical lab test  Review and/or order of tests in the radiology section of CPT  Review and/or order of tests in the medicine section of CPT      Labs:     Recent Labs     01/18/21  0400 01/16/21  1936   WBC 6.1 9.0   HGB 13.8 14.8   HCT 41.0 44.2    UNABLE TO REPORT ACCURATE COUNT DUE TO PLATELET AGGREGATION, HOWEVER, PLATELETS APPEAR NORMAL IN NUMBER ON SMEAR.   PLEASE RESUBMIT SODIUM CITRATE (BLUE) AND EDTA (LAVENDER) TUBES FOR HEMATOLOGICAL TESTING. Recent Labs     01/18/21  0400 01/17/21  0410 01/16/21 1936    138 137   K 3.8 3.5 3.6    105 102   CO2 30 26 28   BUN 8 9 9   CREA 0.89 0.91 1.24   * 101* 95   CA 8.6 8.4* 8.8   MG 2.2 2.1  --    PHOS 3.1  --   --      Recent Labs     01/16/21 1936   ALT 16   *   TBILI 0.6   TP 8.3*   ALB 3.9   GLOB 4.4*   LPSE 84     Recent Labs     01/16/21 1936   INR 1.2*   PTP 12.2*      No results for input(s): FE, TIBC, PSAT, FERR in the last 72 hours. No results found for: FOL, RBCF   No results for input(s): PH, PCO2, PO2 in the last 72 hours. No results for input(s): CPK, CKNDX, TROIQ in the last 72 hours.     No lab exists for component: CPKMB  No results found for: CHOL, CHOLX, CHLST, CHOLV, HDL, HDLP, LDL, LDLC, DLDLP, TGLX, TRIGL, TRIGP, CHHD, CHHDX  No results found for: GLUCPOC  No results found for: COLOR, APPRN, SPGRU, REFSG, GUSTAVO, PROTU, GLUCU, KETU, BILU, UROU, VIRY, LEUKU, GLUKE, EPSU, BACTU, WBCU, RBCU, CASTS, UCRY      Medications Reviewed:     Current Facility-Administered Medications   Medication Dose Route Frequency    peg 3350-Electrolytes-Vit C (MOVIPREP) oral solution 1 L  1 L Oral BID    sodium chloride (NS) flush 5-40 mL  5-40 mL IntraVENous Q8H    sodium chloride (NS) flush 5-40 mL  5-40 mL IntraVENous PRN    dextrose 5% - 0.45% NaCl with KCl 20 mEq/L infusion  75 mL/hr IntraVENous CONTINUOUS    naloxone (NARCAN) injection 0.2 mg  0.2 mg SubCUTAneous EVERY 2 MINUTES AS NEEDED    gabapentin (NEURONTIN) capsule 600 mg  600 mg Oral TID    escitalopram oxalate (LEXAPRO) tablet 20 mg  20 mg Oral DAILY    furosemide (LASIX) tablet 20 mg  20 mg Oral DAILY    morphine injection 4 mg  4 mg IntraVENous Q3H PRN    ondansetron (ZOFRAN) injection 4 mg  4 mg IntraVENous Q8H PRN    prochlorperazine (COMPAZINE) with saline injection 5 mg  5 mg IntraVENous Q4H PRN    acetaminophen (TYLENOL) tablet 650 mg  650 mg Oral Q4H PRN     ______________________________________________________________________  EXPECTED LENGTH OF STAY: 2d 4h  ACTUAL LENGTH OF STAY:          0                 Javier Cheney MD

## 2021-01-18 NOTE — PROGRESS NOTES
[address]  174 [facility], [address]       GI PROGRESS NOTE  [facility], [address] [place] [address] office  [phone] NP in-hospital cell phone M-F until 4:30  After 5pm or on weekends, please call  for physician on call      NAME: Nova Sanchez   :  1955   MRN:  [id number]       Subjective:   No GI complaints, he took half of prep, no bowel movement. Objective:     VITALS:   Last 24hrs VS reviewed since prior progress note. Most recent are:  Visit Vitals  /80   Pulse 79   Temp 97.8 °F (36.6 °C)   Resp 16   SpO2 97%       PHYSICAL EXAM:  General: Cooperative, no acute distress    Neurologic:  Alert and oriented X 3. HEENT: EOMI, no scleral icterus   Lungs:  No increased WOB  Heart:  S1 S2   Abdomen: Soft, distended, no tenderness. +Bowel sounds  Extremities: warm  Psych:   Good insight. Not anxious or agitated. Lab Data Reviewed:     Recent Results (from the past 24 hour(s))   CBC WITH AUTOMATED DIFF    Collection Time: 21  4:00 AM   Result Value Ref Range    WBC 6.1 4.1 - 11.1 K/uL    RBC 4.55 4.10 - 5.70 M/uL    HGB 13.8 12.1 - 17.0 g/dL    HCT 41.0 36.6 - 50.3 %    MCV 90.1 80.0 - 99.0 FL    MCH 30.3 26.0 - 34.0 PG    MCHC 33.7 30.0 - 36.5 g/dL    RDW 13.9 11.5 - 14.5 %    PLATELET 161 710 - 609 K/uL    MPV 9.6 8.9 - 12.9 FL    NRBC 0.0 0  WBC    ABSOLUTE NRBC 0.00 0.00 - 0.01 K/uL    NEUTROPHILS 66 32 - 75 %    LYMPHOCYTES 16 12 - 49 %    MONOCYTES 11 5 - 13 %    EOSINOPHILS 5 0 - 7 %    BASOPHILS 1 0 - 1 %    IMMATURE GRANULOCYTES 1 (H) 0.0 - 0.5 %    ABS. NEUTROPHILS 3.9 1.8 - 8.0 K/UL    ABS. LYMPHOCYTES 1.0 0.8 - 3.5 K/UL    ABS. MONOCYTES 0.7 0.0 - 1.0 K/UL    ABS. EOSINOPHILS 0.3 0.0 - 0.4 K/UL    ABS. BASOPHILS 0.1 0.0 - 0.1 K/UL    ABS. IMM.  GRANS. 0.1 (H) 0.00 - 0.04 K/UL    DF SMEAR SCANNED      RBC COMMENTS NORMOCYTIC, NORMOCHROMIC     MAGNESIUM    Collection Time: 21  4:00 AM   Result Value Ref Range    Magnesium 2.2 1.6 - 2.4 mg/dL   METABOLIC PANEL, BASIC    Collection Time: 01/18/21  4:00 AM   Result Value Ref Range    Sodium 138 136 - 145 mmol/L    Potassium 3.8 3.5 - 5.1 mmol/L    Chloride 104 97 - 108 mmol/L    CO2 30 21 - 32 mmol/L    Anion gap 4 (L) 5 - 15 mmol/L    Glucose 118 (H) 65 - 100 mg/dL    BUN 8 6 - 20 MG/DL    Creatinine 0.89 0.70 - 1.30 MG/DL    BUN/Creatinine ratio 9 (L) 12 - 20      GFR est AA >60 >60 ml/min/1.73m2    GFR est non-AA >60 >60 ml/min/1.73m2    Calcium 8.6 8.5 - 10.1 MG/DL   PHOSPHORUS    Collection Time: 01/18/21  4:00 AM   Result Value Ref Range    Phosphorus 3.1 2.6 - 4.7 MG/DL            Assessment:   · Lower abdominal pain: redundant colon with anatomical displacement  · Fecal stasis  · History of colon polyps     Patient Active Problem List   Diagnosis Code    Volvulus (HCC) K56.2    Abdominal pain R10.9     Plan:   · Clear liquid diet, NPO midnight  · Fleets enema  · Complete second half of prep now, more prep this evening and overnight  · Plan for colonoscopy tomorrow, needs negative COVID testing  · Discussed with RN      Signed By: Kishan Longoria NP     1/18/2021  11:06 AM

## 2021-01-18 NOTE — PROGRESS NOTES
NUTRITION     Nutrition screening referral was triggered based on results obtained during nursing admission assessment for Enteral/Parenteral Nutrition PTA    The patient's chart was reviewed and nutrition assessment is not indicated at this time. Patient has a history of J-tube but this was removed in 2019. Plan to see patient for rescreen as indicated. Thank you.      Josephine Fernandes RD [facility]

## 2021-01-19 LAB
ANION GAP SERPL CALC-SCNC: 5 MMOL/L (ref 5–15)
BASOPHILS # BLD: 0.1 K/UL (ref 0–0.1)
BASOPHILS NFR BLD: 1 % (ref 0–1)
BUN SERPL-MCNC: 7 MG/DL (ref 6–20)
BUN/CREAT SERPL: 8 (ref 12–20)
CALCIUM SERPL-MCNC: 8.3 MG/DL (ref 8.5–10.1)
CHLORIDE SERPL-SCNC: 105 MMOL/L (ref 97–108)
CO2 SERPL-SCNC: 29 MMOL/L (ref 21–32)
CREAT SERPL-MCNC: 0.9 MG/DL (ref 0.7–1.3)
DIFFERENTIAL METHOD BLD: NORMAL
EOSINOPHIL # BLD: 0.2 K/UL (ref 0–0.4)
EOSINOPHIL NFR BLD: 4 % (ref 0–7)
ERYTHROCYTE [DISTWIDTH] IN BLOOD BY AUTOMATED COUNT: 13.9 % (ref 11.5–14.5)
GLUCOSE SERPL-MCNC: 110 MG/DL (ref 65–100)
HCT VFR BLD AUTO: 39.7 % (ref 36.6–50.3)
HGB BLD-MCNC: 13.4 G/DL (ref 12.1–17)
IMM GRANULOCYTES # BLD AUTO: 0 K/UL (ref 0–0.04)
IMM GRANULOCYTES NFR BLD AUTO: 0 % (ref 0–0.5)
LYMPHOCYTES # BLD: 1 K/UL (ref 0.8–3.5)
LYMPHOCYTES NFR BLD: 16 % (ref 12–49)
MAGNESIUM SERPL-MCNC: 2.2 MG/DL (ref 1.6–2.4)
MCH RBC QN AUTO: 29.8 PG (ref 26–34)
MCHC RBC AUTO-ENTMCNC: 33.8 G/DL (ref 30–36.5)
MCV RBC AUTO: 88.2 FL (ref 80–99)
MONOCYTES # BLD: 0.6 K/UL (ref 0–1)
MONOCYTES NFR BLD: 10 % (ref 5–13)
NEUTS SEG # BLD: 4.4 K/UL (ref 1.8–8)
NEUTS SEG NFR BLD: 69 % (ref 32–75)
NRBC # BLD: 0 K/UL (ref 0–0.01)
NRBC BLD-RTO: 0 PER 100 WBC
PHOSPHATE SERPL-MCNC: 2.9 MG/DL (ref 2.6–4.7)
PLATELET # BLD AUTO: 248 K/UL (ref 150–400)
PMV BLD AUTO: 9.4 FL (ref 8.9–12.9)
POTASSIUM SERPL-SCNC: 3.6 MMOL/L (ref 3.5–5.1)
RBC # BLD AUTO: 4.5 M/UL (ref 4.1–5.7)
SODIUM SERPL-SCNC: 139 MMOL/L (ref 136–145)
WBC # BLD AUTO: 6.4 K/UL (ref 4.1–11.1)

## 2021-01-19 PROCEDURE — 36415 COLL VENOUS BLD VENIPUNCTURE: CPT

## 2021-01-19 PROCEDURE — 80048 BASIC METABOLIC PNL TOTAL CA: CPT

## 2021-01-19 PROCEDURE — 84100 ASSAY OF PHOSPHORUS: CPT

## 2021-01-19 PROCEDURE — 74011250636 HC RX REV CODE- 250/636: Performed by: INTERNAL MEDICINE

## 2021-01-19 PROCEDURE — 65270000029 HC RM PRIVATE

## 2021-01-19 PROCEDURE — 74011000250 HC RX REV CODE- 250: Performed by: NURSE PRACTITIONER

## 2021-01-19 PROCEDURE — 74011250637 HC RX REV CODE- 250/637: Performed by: NURSE PRACTITIONER

## 2021-01-19 PROCEDURE — 94760 N-INVAS EAR/PLS OXIMETRY 1: CPT

## 2021-01-19 PROCEDURE — 74011250636 HC RX REV CODE- 250/636: Performed by: FAMILY MEDICINE

## 2021-01-19 PROCEDURE — 83735 ASSAY OF MAGNESIUM: CPT

## 2021-01-19 PROCEDURE — 74011250637 HC RX REV CODE- 250/637: Performed by: FAMILY MEDICINE

## 2021-01-19 PROCEDURE — 85025 COMPLETE CBC W/AUTO DIFF WBC: CPT

## 2021-01-19 RX ORDER — MAGNESIUM CITRATE
296 SOLUTION, ORAL ORAL
Status: COMPLETED | OUTPATIENT
Start: 2021-01-19 | End: 2021-01-19

## 2021-01-19 RX ORDER — FACIAL-BODY WIPES
10 EACH TOPICAL 2 TIMES DAILY
Status: COMPLETED | OUTPATIENT
Start: 2021-01-19 | End: 2021-01-19

## 2021-01-19 RX ORDER — MAGNESIUM CITRATE
296 SOLUTION, ORAL ORAL ONCE
Status: COMPLETED | OUTPATIENT
Start: 2021-01-19 | End: 2021-01-19

## 2021-01-19 RX ADMIN — Medication 10 ML: at 15:21

## 2021-01-19 RX ADMIN — Medication 10 ML: at 22:31

## 2021-01-19 RX ADMIN — MORPHINE SULFATE 4 MG: 2 INJECTION, SOLUTION INTRAMUSCULAR; INTRAVENOUS at 02:11

## 2021-01-19 RX ADMIN — MORPHINE SULFATE 4 MG: 2 INJECTION, SOLUTION INTRAMUSCULAR; INTRAVENOUS at 19:18

## 2021-01-19 RX ADMIN — FUROSEMIDE 20 MG: 40 TABLET ORAL at 09:03

## 2021-01-19 RX ADMIN — DEXTROSE MONOHYDRATE, SODIUM CHLORIDE, AND POTASSIUM CHLORIDE 75 ML/HR: 50; 4.5; 1.49 INJECTION, SOLUTION INTRAVENOUS at 22:36

## 2021-01-19 RX ADMIN — MORPHINE SULFATE 4 MG: 2 INJECTION, SOLUTION INTRAMUSCULAR; INTRAVENOUS at 15:19

## 2021-01-19 RX ADMIN — MAGNESIUM CITRATE 296 ML: 1.75 LIQUID ORAL at 10:39

## 2021-01-19 RX ADMIN — ESCITALOPRAM OXALATE 20 MG: 10 TABLET ORAL at 09:03

## 2021-01-19 RX ADMIN — MORPHINE SULFATE 4 MG: 2 INJECTION, SOLUTION INTRAMUSCULAR; INTRAVENOUS at 12:14

## 2021-01-19 RX ADMIN — BISACODYL 10 MG: 10 SUPPOSITORY RECTAL at 10:39

## 2021-01-19 RX ADMIN — POLYETHYLENE GLYCOL 3350, SODIUM SULFATE, SODIUM CHLORIDE, POTASSIUM CHLORIDE, ASCORBIC ACID, SODIUM ASCORBATE 1 L: KIT at 01:53

## 2021-01-19 RX ADMIN — GABAPENTIN 600 MG: 300 CAPSULE ORAL at 22:28

## 2021-01-19 RX ADMIN — Medication 10 ML: at 06:00

## 2021-01-19 RX ADMIN — MORPHINE SULFATE 4 MG: 2 INJECTION, SOLUTION INTRAMUSCULAR; INTRAVENOUS at 23:00

## 2021-01-19 RX ADMIN — MAGNESIUM CITRATE 296 ML: 1.75 LIQUID ORAL at 15:19

## 2021-01-19 RX ADMIN — BISACODYL 10 MG: 10 SUPPOSITORY RECTAL at 17:15

## 2021-01-19 NOTE — PROGRESS NOTES
[address] [facility] Adult  Hospitalist Group                                                                                          Hospitalist Progress Note  Eduardo Lovell MD  Answering service: [phone] from in house phone        Date of Service:  2021  NAME:  Misbah Ulrich  :  1955  MRN:  [id number]      Admission Summary:   Misbah Ulrich is a 77 y.o. 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 Interval history / Subjective:   Says he is having intermittent bowel movements, but stools are not clear. Per GI to get additional laxatives and enema today, and potentially scope tomorrow      Assessment & Plan:     Plan:   Redundant colon with anatomical displacement - no volvulus on repeat CT  - Clear liquids   - morphine IV for pain  - Golytely prep, add mag citrate, enema?  - Plan for potential colonoscopy tomorrow, NPO past midnight. - hold home lactulose, and relastor that was being taken for abdominal bloating      Hx PE  -on coumdin due to PE in 2019, was on NOAC, but failed treatment due to progression of PE while on medication. 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 He the transitioned to argatro  -INR 1.2, hemoccult negative   - Hold coumadin for now.  Need to ask PCP or patient if this was provoked.      Hx oropharyngeal CA s/p chemoradiation  -has trouble speaking due to radiation, though was better today, also can not read or write per wife  -s/p J tube removal     Arachnoiditis  -uses morphine 60mg BID at home for chronic pain  -start morphine 4mg IV BID for abdominal and arachnoiditis pain  -continue gabapentin 600mg TID  -narcan     LE edema  -continue home lasix     Depression/Anxiety/Insomina  -continue lexapro  -hold ambien in the setting of IV morphine to prevent over sedation     Code status: FULL  DVT prophylaxis: SCDs    Care Plan discussed with: Patient/Family  Anticipated Disposition: Home w/Family  Anticipated Discharge: 24 hours to 48 hours     Hospital Problems  Date Reviewed: 6/14/2018          Codes Class Noted POA    Abdominal pain ICD-10-CM: R10.9  ICD-9-CM: 789.00  1/18/2021 Unknown        Volvulus ([address] ICD-10-CM: K56.2  ICD-9-CM: 560.2  1/17/2021 Unknown                Review of Systems:   A comprehensive review of systems was negative except for that written in the HPI. Vital Signs:    Last 24hrs VS reviewed since prior progress note. Most recent are:  Visit Vitals  /75 (BP 1 Location: Left arm, BP Patient Position: At rest)   Pulse 77   Temp 98.6 °F (37 °C)   Resp 18   Wt 108.2 kg (238 lb 9.6 oz)   SpO2 96%   BMI 27.93 kg/m²         Intake/Output Summary (Last 24 hours) at 1/19/2021 1518  Last data filed at 1/19/2021 7303  Gross per 24 hour   Intake    Output 350 ml   Net -350 ml        Physical Examination:     I had a face to face encounter with this patient and independently examined them on 1/19/2021 as outlined below:          Constitutional:  No acute distress, cooperative, pleasant, obese   ENT: MMM   Resp:  CTA bilaterally. No wheezing/rhonchi/rales. No accessory muscle use   CV:  Regular rhythm, normal rate, no murmurs, gallops, rubs    GI:  Soft, + distended,  Non tender to palpation. +active bowel sounds, no hepatosplenomegaly     Musculoskeletal:  No edema, warm, 2+ pulses throughout    Neurologic:  Moves all extremities. Following commands.       Skin:  Good turgor, no rashes or ulcers       Data Review:    Review and/or order of clinical lab test  Review and/or order of tests in the radiology section of CPT  Review and/or order of tests in the medicine section of CPT      Labs:     Recent Labs     01/19/21  0153 01/18/21  0400   WBC 6.4 6.1   HGB 13.4 13.8   HCT 39.7 41.0    241     Recent Labs     01/19/21  0153 01/18/21  0400 01/17/21  0410    138 138   K 3.6 3.8 3.5    104 105   CO2 29 30 26   BUN 7 8 9   CREA 0.90 0.89 0.91   * 118* 101*   CA 8.3* 8.6 8.4*   MG 2.2 2.2 2.1   PHOS 2.9 3.1  --      Recent Labs     01/16/21 1936   ALT 16   *   TBILI 0.6   TP 8.3*   ALB 3.9   GLOB 4.4*   LPSE 84     Recent Labs     01/16/21 1936   INR 1.2*   PTP 12.2*      No results for input(s): FE, TIBC, PSAT, FERR in the last 72 hours. No results found for: FOL, RBCF   No results for input(s): PH, PCO2, PO2 in the last 72 hours. No results for input(s): CPK, CKNDX, TROIQ in the last 72 hours.     No lab exists for component: CPKMB  No results found for: CHOL, CHOLX, CHLST, CHOLV, HDL, HDLP, LDL, LDLC, DLDLP, TGLX, TRIGL, TRIGP, CHHD, CHHDX  No results found for: GLUCPOC  No results found for: COLOR, APPRN, SPGRU, REFSG, GUSTAVO, PROTU, GLUCU, KETU, BILU, UROU, VIRY, LEUKU, GLUKE, EPSU, BACTU, WBCU, RBCU, CASTS, UCRY      Medications Reviewed:     Current Facility-Administered Medications   Medication Dose Route Frequency    bisacodyL (DULCOLAX) suppository 10 mg  10 mg Rectal BID    magnesium citrate solution 296 mL  296 mL Oral ONCE    sodium chloride (NS) flush 5-40 mL  5-40 mL IntraVENous Q8H    sodium chloride (NS) flush 5-40 mL  5-40 mL IntraVENous PRN    dextrose 5% - 0.45% NaCl with KCl 20 mEq/L infusion  75 mL/hr IntraVENous CONTINUOUS    naloxone (NARCAN) injection 0.2 mg  0.2 mg SubCUTAneous EVERY 2 MINUTES AS NEEDED    gabapentin (NEURONTIN) capsule 600 mg  600 mg Oral TID    escitalopram oxalate (LEXAPRO) tablet 20 mg  20 mg Oral DAILY    furosemide (LASIX) tablet 20 mg  20 mg Oral DAILY    morphine injection 4 mg  4 mg IntraVENous Q3H PRN    ondansetron (ZOFRAN) injection 4 mg  4 mg IntraVENous Q8H PRN    prochlorperazine (COMPAZINE) with saline injection 5 mg  5 mg IntraVENous Q4H PRN    acetaminophen (TYLENOL) tablet 650 mg  650 mg Oral Q4H PRN     ______________________________________________________________________  EXPECTED LENGTH OF STAY: 2d 4h  ACTUAL LENGTH OF STAY:          1                 Delphine May MD

## 2021-01-19 NOTE — PROGRESS NOTES
Bedside and Verbal shift change report given to [employer], RN (oncoming nurse) by Elisabeth Yeager RN (offgoing nurse).  Report included the following information SBAR, Kardex, Intake/Output and MAR

## 2021-01-19 NOTE — PROGRESS NOTES
Patient had multiple BMs after enema, tolerated bowel prep well. Bedside shift change report given to Anna DEJESUS (oncoming nurse) by Luz Marina Ellis RN (offgoing nurse). Report included the following information SBAR, Kardex, Intake/Output and MAR.

## 2021-01-19 NOTE — PROGRESS NOTES
[address]  174 [facility], [address]       GI PROGRESS NOTE  [facility] office  [phone] NP in-hospital cell phone M-F until 4:30  After 5pm or on weekends, please call  for physician on call      NAME: Lieutenant Sethi   :  1955   MRN:  [id number]       Subjective:   Still having some solid stool despite enema and two complete preps. He is having bowel movements. Objective:     VITALS:   Last 24hrs VS reviewed since prior progress note. Most recent are:  Visit Vitals  /75   Pulse 77   Temp 97.4 °F (36.3 °C)   Resp 17   Wt 108.2 kg (238 lb 9.6 oz)   SpO2 94%   BMI 27.93 kg/m²       PHYSICAL EXAM:  General: Cooperative, no acute distress    Neurologic:  Alert and oriented X 3. HEENT: EOMI, no scleral icterus   Lungs:  No increased WOB  Heart:  S1 S2   Abdomen: Soft, distended, lower abdominal tenderness. +Bowel sounds  Extremities: warm  Psych:   Good insight. Not anxious or agitated. Lab Data Reviewed:     Recent Results (from the past 24 hour(s))   SARS-COV-2    Collection Time: 21 12:38 PM   Result Value Ref Range    Specimen source Nasopharyngeal      Specimen source Nasopharyngeal      COVID-19 rapid test Not detected NOTD      Specimen type NP Swab     CBC WITH AUTOMATED DIFF    Collection Time: 21  1:53 AM   Result Value Ref Range    WBC 6.4 4.1 - 11.1 K/uL    RBC 4.50 4. 10 - 5.70 M/uL    HGB 13.4 12.1 - 17.0 g/dL    HCT 39.7 36.6 - 50.3 %    MCV 88.2 80.0 - 99.0 FL    MCH 29.8 26.0 - 34.0 PG    MCHC 33.8 30.0 - 36.5 g/dL    RDW 13.9 11.5 - 14.5 %    PLATELET 756 074 - 772 K/uL    MPV 9.4 8.9 - 12.9 FL    NRBC 0.0 0  WBC    ABSOLUTE NRBC 0.00 0.00 - 0.01 K/uL    NEUTROPHILS 69 32 - 75 %    LYMPHOCYTES 16 12 - 49 %    MONOCYTES 10 5 - 13 %    EOSINOPHILS 4 0 - 7 %    BASOPHILS 1 0 - 1 %    IMMATURE GRANULOCYTES 0 0.0 - 0.5 %    ABS. NEUTROPHILS 4.4 1.8 - 8.0 K/UL    ABS.  LYMPHOCYTES 1.0 0.8 - 3.5 K/UL ABS. MONOCYTES 0.6 0.0 - 1.0 K/UL    ABS. EOSINOPHILS 0.2 0.0 - 0.4 K/UL    ABS. BASOPHILS 0.1 0.0 - 0.1 K/UL    ABS. IMM.  GRANS. 0.0 0.00 - 0.04 K/UL    DF AUTOMATED     MAGNESIUM    Collection Time: 01/19/21  1:53 AM   Result Value Ref Range    Magnesium 2.2 1.6 - 2.4 mg/dL   METABOLIC PANEL, BASIC    Collection Time: 01/19/21  1:53 AM   Result Value Ref Range    Sodium 139 136 - 145 mmol/L    Potassium 3.6 3.5 - 5.1 mmol/L    Chloride 105 97 - 108 mmol/L    CO2 29 21 - 32 mmol/L    Anion gap 5 5 - 15 mmol/L    Glucose 110 (H) 65 - 100 mg/dL    BUN 7 6 - 20 MG/DL    Creatinine 0.90 0.70 - 1.30 MG/DL    BUN/Creatinine ratio 8 (L) 12 - 20      GFR est AA >60 >60 ml/min/1.73m2    GFR est non-AA >60 >60 ml/min/1.73m2    Calcium 8.3 (L) 8.5 - 10.1 MG/DL   PHOSPHORUS    Collection Time: 01/19/21  1:53 AM   Result Value Ref Range    Phosphorus 2.9 2.6 - 4.7 MG/DL        Assessment:   · Lower abdominal pain: redundant colon with anatomical displacement  · Fecal stasis  · History of colon polyps     Patient Active Problem List   Diagnosis Code    Volvulus (HCC) K56.2    Abdominal pain R10.9     Plan:   · Clear liquid diet, NPO midnight  · Dulcolax and Magnesium Citrate x 2 today  · If not improving plan for methylnaltrexone later today   · Despite taking two preps, not ready for procedure today, plan for colonoscopy tomorrow      Signed By: Vane Cummings NP     1/19/2021  11:06 AM

## 2021-01-19 NOTE — PROGRESS NOTES
5:22 PM: Patient had a loose/watery brown BM that RN was able to see earlier, none since. Patient given second suppository and finishing last of 2nd dose of magnesium citrate at this time. 8:57 PM: Patient had another large watery brown BM towards end of shift. Bedside and Verbal shift change report given to Demetri [address] [place] [address] (oncoming nurse) by [employer], RN (offgoing nurse). Report included the following information SBAR, Kardex, Intake/Output, MAR and Recent Results.

## 2021-01-20 ENCOUNTER — APPOINTMENT (OUTPATIENT)
Dept: GENERAL RADIOLOGY | Age: 66
DRG: 393 | End: 2021-01-20
Attending: INTERNAL MEDICINE
Payer: MEDICARE

## 2021-01-20 LAB
ANION GAP SERPL CALC-SCNC: 6 MMOL/L (ref 5–15)
BUN SERPL-MCNC: 4 MG/DL (ref 6–20)
BUN/CREAT SERPL: 5 (ref 12–20)
CALCIUM SERPL-MCNC: 8.8 MG/DL (ref 8.5–10.1)
CHLORIDE SERPL-SCNC: 108 MMOL/L (ref 97–108)
CO2 SERPL-SCNC: 27 MMOL/L (ref 21–32)
CREAT SERPL-MCNC: 0.8 MG/DL (ref 0.7–1.3)
GLUCOSE SERPL-MCNC: 97 MG/DL (ref 65–100)
MAGNESIUM SERPL-MCNC: 2.7 MG/DL (ref 1.6–2.4)
PHOSPHATE SERPL-MCNC: 2.4 MG/DL (ref 2.6–4.7)
POTASSIUM SERPL-SCNC: 3.8 MMOL/L (ref 3.5–5.1)
SODIUM SERPL-SCNC: 141 MMOL/L (ref 136–145)

## 2021-01-20 PROCEDURE — 83735 ASSAY OF MAGNESIUM: CPT

## 2021-01-20 PROCEDURE — 94760 N-INVAS EAR/PLS OXIMETRY 1: CPT

## 2021-01-20 PROCEDURE — 36415 COLL VENOUS BLD VENIPUNCTURE: CPT

## 2021-01-20 PROCEDURE — 74011250636 HC RX REV CODE- 250/636: Performed by: FAMILY MEDICINE

## 2021-01-20 PROCEDURE — 74011250637 HC RX REV CODE- 250/637: Performed by: NURSE PRACTITIONER

## 2021-01-20 PROCEDURE — 65270000029 HC RM PRIVATE

## 2021-01-20 PROCEDURE — 74011250636 HC RX REV CODE- 250/636: Performed by: INTERNAL MEDICINE

## 2021-01-20 PROCEDURE — 74011636637 HC RX REV CODE- 636/637: Performed by: NURSE PRACTITIONER

## 2021-01-20 PROCEDURE — 84100 ASSAY OF PHOSPHORUS: CPT

## 2021-01-20 PROCEDURE — 74018 RADEX ABDOMEN 1 VIEW: CPT

## 2021-01-20 PROCEDURE — 74011250637 HC RX REV CODE- 250/637: Performed by: FAMILY MEDICINE

## 2021-01-20 PROCEDURE — 80048 BASIC METABOLIC PNL TOTAL CA: CPT

## 2021-01-20 RX ORDER — MAGNESIUM CITRATE
296 SOLUTION, ORAL ORAL
Status: COMPLETED | OUTPATIENT
Start: 2021-01-20 | End: 2021-01-20

## 2021-01-20 RX ORDER — FACIAL-BODY WIPES
10 EACH TOPICAL 2 TIMES DAILY
Status: COMPLETED | OUTPATIENT
Start: 2021-01-20 | End: 2021-01-20

## 2021-01-20 RX ORDER — BISACODYL 5 MG
5 TABLET, DELAYED RELEASE (ENTERIC COATED) ORAL 2 TIMES DAILY
Status: COMPLETED | OUTPATIENT
Start: 2021-01-20 | End: 2021-01-20

## 2021-01-20 RX ADMIN — GABAPENTIN 600 MG: 300 CAPSULE ORAL at 21:46

## 2021-01-20 RX ADMIN — MORPHINE SULFATE 4 MG: 2 INJECTION, SOLUTION INTRAMUSCULAR; INTRAVENOUS at 13:44

## 2021-01-20 RX ADMIN — METHYLNALTREXONE BROMIDE 12 MG: 12 INJECTION, SOLUTION SUBCUTANEOUS at 09:54

## 2021-01-20 RX ADMIN — BISACODYL 10 MG: 10 SUPPOSITORY RECTAL at 11:17

## 2021-01-20 RX ADMIN — BISACODYL 5 MG: 5 TABLET, COATED ORAL at 11:17

## 2021-01-20 RX ADMIN — Medication 5 ML: at 22:00

## 2021-01-20 RX ADMIN — GABAPENTIN 600 MG: 300 CAPSULE ORAL at 17:29

## 2021-01-20 RX ADMIN — BISACODYL 10 MG: 10 SUPPOSITORY RECTAL at 17:29

## 2021-01-20 RX ADMIN — GABAPENTIN 600 MG: 300 CAPSULE ORAL at 09:15

## 2021-01-20 RX ADMIN — MAGNESIUM CITRATE 296 ML: 1.75 LIQUID ORAL at 17:29

## 2021-01-20 RX ADMIN — DEXTROSE MONOHYDRATE, SODIUM CHLORIDE, AND POTASSIUM CHLORIDE 75 ML/HR: 50; 4.5; 1.49 INJECTION, SOLUTION INTRAVENOUS at 12:05

## 2021-01-20 RX ADMIN — MAGNESIUM CITRATE 296 ML: 1.75 LIQUID ORAL at 12:00

## 2021-01-20 RX ADMIN — MORPHINE SULFATE 4 MG: 2 INJECTION, SOLUTION INTRAMUSCULAR; INTRAVENOUS at 17:29

## 2021-01-20 RX ADMIN — MORPHINE SULFATE 4 MG: 2 INJECTION, SOLUTION INTRAMUSCULAR; INTRAVENOUS at 09:53

## 2021-01-20 RX ADMIN — FUROSEMIDE 20 MG: 40 TABLET ORAL at 09:15

## 2021-01-20 RX ADMIN — MORPHINE SULFATE 4 MG: 2 INJECTION, SOLUTION INTRAMUSCULAR; INTRAVENOUS at 21:46

## 2021-01-20 RX ADMIN — ESCITALOPRAM OXALATE 20 MG: 10 TABLET ORAL at 09:15

## 2021-01-20 RX ADMIN — ACETAMINOPHEN 650 MG: 325 TABLET ORAL at 12:10

## 2021-01-20 RX ADMIN — BISACODYL 5 MG: 5 TABLET, COATED ORAL at 17:28

## 2021-01-20 NOTE — PROGRESS NOTES
[facility] Adult  Hospitalist Group                                                                                          Hospitalist Progress Note  Delphine May MD  Answering service: [phone] from in house phone        Date of Service:  2021  NAME:  Hannah Powell  :  1955  MRN:  [id number]      Admission Summary:   Hannah Powell is a 77 y.o. 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 Interval history / Subjective:   Pt states he had bowel movements yesterday, and then none since. Definitley not clear. Given methylnaltrexone today, mag citrate, and suppositories     Assessment & Plan:     Plan:   Redundant colon with anatomical displacement - no volvulus on repeat CT  - Clear liquids   - morphine IV PRN for pain  - s/p 2 L golytley  - Mag citrate, suppositories and methylnaltrexone today   - Plan for potential colonoscopy tomorrow, NPO past midnight. - hold home lactulose, and relastor that was being taken for abdominal bloating   - If patient is not clear tomorrow, then likely can DC home and F/U outpatient per GI     Hx PE  -on coumdin due to PE in 2019, was on NOAC, but failed treatment due to progression of PE while on medication. 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 He the transitioned to argatro  -INR 1.2 and subtherapeutic on admission, hemoccult negative   - Hold coumadin for now. prior to procedure.  Need to ask PCP if this was provoked.      Hx oropharyngeal CA s/p chemoradiation  -has trouble speaking due to radiation, , also can not read or write per wife  -s/p J tube removal     Arachnoiditis  -uses morphine 60mg BID at home for chronic pain  -start morphine 4mg IV BID for abdominal and arachnoiditis pain  -continue gabapentin 600mg TID  -narcan     LE edema  -continue home lasix     Depression/Anxiety/Insomina  -continue lexapro  -hold ambien in the setting of IV morphine to prevent over sedation     Code status: FULL  DVT prophylaxis: SCDs    Care Plan discussed with: Patient/Family  Anticipated Disposition: Home w/Family  Anticipated Discharge: 24 hours to 48 hours     Hospital Problems  Date Reviewed: 6/14/2018          Codes Class Noted POA    Abdominal pain ICD-10-CM: R10.9  ICD-9-CM: 789.00  1/18/2021 Unknown        Volvulus ([facility] [address] ICD-10-CM: K56.2  ICD-9-CM: 560.2  1/17/2021 Unknown                Review of Systems:   A comprehensive review of systems was negative except for that written in the HPI. Vital Signs:    Last 24hrs VS reviewed since prior progress note. Most recent are:  Visit Vitals  /74 (BP 1 Location: Left arm)   Pulse 77   Temp 97.9 °F (36.6 °C)   Resp 16   Wt 108.2 kg (238 lb 9.6 oz)   SpO2 94%   BMI 27.93 kg/m²         Intake/Output Summary (Last 24 hours) at 1/20/2021 1507  Last data filed at 1/20/2021 8289  Gross per 24 hour   Intake    Output 400 ml   Net -400 ml        Physical Examination:     I had a face to face encounter with this patient and independently examined them on 1/20/2021 as outlined below:          Constitutional:  No acute distress, cooperative, pleasant, obese   ENT: MMM   Resp:  CTA bilaterally. No wheezing/rhonchi/rales. No accessory muscle use   CV:  Regular rhythm, normal rate, no murmurs, gallops, rubs    GI:  Soft, + distended,  Non tender to palpation. +active bowel sounds, no hepatosplenomegaly     Musculoskeletal:  No edema, warm, 2+ pulses throughout    Neurologic:  Moves all extremities. Following commands.       Skin:  Good turgor, no rashes or ulcers       Data Review:    Review and/or order of clinical lab test  Review and/or order of tests in the radiology section of CPT  Review and/or order of tests in the medicine section of [facility]      Labs:     Recent Labs     01/19/21  0153 01/18/21  0400   WBC 6.4 6.1   HGB 13.4 13.8   HCT 39.7 41.0    241     Recent Labs     01/20/21  0015 01/19/21  0153 01/18/21  0400    139 138   K 3.8 3.6 3.8    105 104   CO2 27 29 30   BUN 4* 7 8   CREA 0.80 0.90 0.89   GLU 97 110* 118*   CA 8.8 8.3* 8.6   MG 2.7* 2.2 2.2   PHOS 2.4* 2.9 3.1     No results for input(s): ALT, AP, TBIL, TBILI, TP, ALB, GLOB, GGT, AML, LPSE in the last 72 hours. No lab exists for component: SGOT, GPT, AMYP, HLPSE  No results for input(s): INR, PTP, APTT, INREXT, INREXT in the last 72 hours. No results for input(s): FE, TIBC, PSAT, FERR in the last 72 hours. No results found for: FOL, RBCF   No results for input(s): PH, PCO2, PO2 in the last 72 hours. No results for input(s): CPK, CKNDX, TROIQ in the last 72 hours.     No lab exists for component: CPKMB  No results found for: CHOL, CHOLX, CHLST, CHOLV, HDL, HDLP, LDL, LDLC, DLDLP, TGLX, TRIGL, TRIGP, CHHD, CHHDX  No results found for: GLUCPOC  No results found for: COLOR, APPRN, SPGRU, REFSG, GUSTAVO, PROTU, GLUCU, KETU, BILU, UROU, VIRY, LEUKU, GLUKE, EPSU, BACTU, WBCU, RBCU, CASTS, UCRY      Medications Reviewed:     Current Facility-Administered Medications   Medication Dose Route Frequency    bisacodyL (DULCOLAX) suppository 10 mg  10 mg Rectal BID    magnesium citrate solution 296 mL  296 mL Oral NOW    bisacodyL (DULCOLAX) tablet 5 mg  5 mg Oral BID    sodium chloride (NS) flush 5-40 mL  5-40 mL IntraVENous Q8H    sodium chloride (NS) flush 5-40 mL  5-40 mL IntraVENous PRN    dextrose 5% - 0.45% NaCl with KCl 20 mEq/L infusion  75 mL/hr IntraVENous CONTINUOUS    naloxone (NARCAN) injection 0.2 mg  0.2 mg SubCUTAneous EVERY 2 MINUTES AS NEEDED    gabapentin (NEURONTIN) capsule 600 mg  600 mg Oral TID    escitalopram oxalate (LEXAPRO) tablet 20 mg  20 mg Oral DAILY    furosemide (LASIX) tablet 20 mg  20 mg Oral DAILY    morphine injection 4 mg  4 mg IntraVENous Q3H PRN   • ondansetron (ZOFRAN) injection 4 mg  4 mg IntraVENous Q8H PRN   • prochlorperazine (COMPAZINE) with saline injection 5 mg  5 mg IntraVENous Q4H PRN   • acetaminophen (TYLENOL) tablet 650 mg  650 mg Oral Q4H PRN     ______________________________________________________________________  EXPECTED LENGTH OF STAY: 2d 4h  ACTUAL LENGTH OF STAY:          2                 Ila Henry MD

## 2021-01-20 NOTE — PROGRESS NOTES
11:28 AM  Pt had a loose, brown bowel movement. It was mixed with urine so it was unclear of how clear it was. 7:27 PM  Pt continued to have brown and watery stools throughout the day. Pt took all ordered bowel medications without change in stool. Bedside shift change report given to [address] [place] [address] (oncoming nurse) by Rachael DEJESUS (offgoing nurse). Report included the following information SBAR, Intake/Output, MAR and Recent Results.

## 2021-01-21 VITALS
HEART RATE: 79 BPM | OXYGEN SATURATION: 95 % | RESPIRATION RATE: 16 BRPM | WEIGHT: 238.6 LBS | TEMPERATURE: 98.6 F | DIASTOLIC BLOOD PRESSURE: 86 MMHG | BODY MASS INDEX: 27.93 KG/M2 | SYSTOLIC BLOOD PRESSURE: 127 MMHG

## 2021-01-21 LAB — MAGNESIUM SERPL-MCNC: 3 MG/DL (ref 1.6–2.4)

## 2021-01-21 PROCEDURE — 36415 COLL VENOUS BLD VENIPUNCTURE: CPT

## 2021-01-21 PROCEDURE — 74011250636 HC RX REV CODE- 250/636: Performed by: INTERNAL MEDICINE

## 2021-01-21 PROCEDURE — 83735 ASSAY OF MAGNESIUM: CPT

## 2021-01-21 PROCEDURE — 94760 N-INVAS EAR/PLS OXIMETRY 1: CPT

## 2021-01-21 PROCEDURE — 74011250637 HC RX REV CODE- 250/637: Performed by: FAMILY MEDICINE

## 2021-01-21 RX ORDER — DOCUSATE SODIUM 100 MG/1
100 CAPSULE, LIQUID FILLED ORAL 2 TIMES DAILY
Qty: 60 CAP | Refills: 2 | Status: SHIPPED | OUTPATIENT
Start: 2021-01-21 | End: 2021-04-21

## 2021-01-21 RX ORDER — POLYETHYLENE GLYCOL 3350 17 G/17G
17 POWDER, FOR SOLUTION ORAL DAILY
Qty: 120 PACKET | Refills: 1 | Status: SHIPPED | OUTPATIENT
Start: 2021-01-21

## 2021-01-21 RX ORDER — METHYLNALTREXONE BROMIDE 150 MG/1
450 TABLET ORAL
Qty: 30 TAB | Refills: 0 | Status: SHIPPED | OUTPATIENT
Start: 2021-01-21

## 2021-01-21 RX ORDER — SENNOSIDES 8.6 MG/1
2 TABLET ORAL DAILY
Qty: 60 TAB | Refills: 0 | Status: SHIPPED | OUTPATIENT
Start: 2021-01-21

## 2021-01-21 RX ADMIN — FUROSEMIDE 20 MG: 40 TABLET ORAL at 08:36

## 2021-01-21 RX ADMIN — GABAPENTIN 600 MG: 300 CAPSULE ORAL at 08:36

## 2021-01-21 RX ADMIN — ESCITALOPRAM OXALATE 20 MG: 10 TABLET ORAL at 08:36

## 2021-01-21 RX ADMIN — GABAPENTIN 600 MG: 300 CAPSULE ORAL at 15:36

## 2021-01-21 RX ADMIN — MORPHINE SULFATE 4 MG: 2 INJECTION, SOLUTION INTRAMUSCULAR; INTRAVENOUS at 08:37

## 2021-01-21 RX ADMIN — MORPHINE SULFATE 4 MG: 2 INJECTION, SOLUTION INTRAMUSCULAR; INTRAVENOUS at 15:36

## 2021-01-21 NOTE — PROGRESS NOTES
[facility] Adult  Hospitalist Group                                                                                          Hospitalist Progress Note  West Blunt MD  Answering service: [phone] OR [phone] from in house phone        Date of Service:  2021  NAME:  Leon Mariscal  :  1955  MRN:  [id number]      Admission Summary:   Leon Mariscal is a 77 y.o. 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 Interval history / Subjective:   F/u abd pain  Pt states he had bowel movements yesterday, and then none since. Definitley not clear. Given methylnaltrexone today, mag citrate, and suppositories     Assessment & Plan:     Plan:   Redundant colon with anatomical displacement - no volvulus on repeat CT  - Clear liquids   - morphine IV PRN for pain  - s/p 2 L golytley  - Mag citrate, suppositories and methylnaltrexone    - Plan for potential colonoscopy today, will talk to GI  - hold home lactulose, and relastor that was being taken for abdominal bloating      Hx PE  -on coumdin due to PE in , was on NOAC, but failed treatment due to progression of PE while on medication. 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 He the transitioned to argatro  -INR 1.2 and subtherapeutic on admission, hemoccult negative   - Hold coumadin for now. prior to procedure.  Need to ask PCP if this was provoked.      Hx oropharyngeal CA s/p chemoradiation  -has trouble speaking due to radiation, , also can not read or write per wife  -s/p J tube removal     Arachnoiditis  -uses morphine 60mg BID at home for chronic pain  -start morphine 4mg IV BID for abdominal and arachnoiditis pain  -continue gabapentin 600mg TID  -narcan     LE edema  -continue home lasix     Depression/Anxiety/Insomina  -continue lexapro  -hold ambien in the setting of IV morphine to prevent over sedation     Code status: FULL  DVT prophylaxis: SCDs    Care Plan discussed with: Patient/Family  Anticipated Disposition: Home w/Family  Anticipated Discharge: 24 hours to 48 hours     Hospital Problems  Date Reviewed: 1/21/2021          Codes Class Noted POA    Abdominal pain ICD-10-CM: R10.9  ICD-9-CM: 789.00  1/18/2021 Unknown        * (Principal) Volvulus ([address] ICD-10-CM: U72.2  ICD-9-CM: 560.2  1/17/2021 Yes                Review of Systems:   A comprehensive review of systems was negative except for that written in the HPI. Vital Signs:    Last 24hrs VS reviewed since prior progress note. Most recent are:  Visit Vitals  /82   Pulse 77   Temp 98.8 °F (37.1 °C)   Resp 16   Wt 108.2 kg (238 lb 9.6 oz)   SpO2 93%   BMI 27.93 kg/m²         Intake/Output Summary (Last 24 hours) at 1/21/2021 0848  Last data filed at 1/20/2021 1719  Gross per 24 hour   Intake    Output 750 ml   Net -750 ml        Physical Examination:     I had a face to face encounter with this patient and independently examined them on 1/21/2021 as outlined below:          Constitutional:  No acute distress, cooperative, pleasant, obese   ENT: MMM   Resp:  CTA bilaterally. No wheezing/rhonchi/rales. No accessory muscle use   CV:  Regular rhythm, normal rate, no murmurs, gallops, rubs    GI:  Soft, + distended,  Non tender to palpation. +active bowel sounds, no hepatosplenomegaly     Musculoskeletal:  No edema, warm, 2+ pulses throughout    Neurologic:  Moves all extremities. Following commands.       Skin:  Good turgor, no rashes or ulcers       Data Review:    Review and/or order of clinical lab test  Review and/or order of tests in the radiology section of CPT  Review and/or order of tests in the medicine section of CPT      Labs:     Recent Labs     01/19/21  0153   WBC 6.4   HGB 13.4   HCT 39.7      Recent Labs     01/21/21  0505 01/20/21  0015 01/19/21  0153   NA  --  141 139   K  --  3.8 3.6   CL  --  108 105   CO2  --  27 29   BUN  --  4* 7   CREA  --  0.80 0.90   GLU  --  97 110*   CA  --  8.8 8.3*   MG 3.0* 2.7* 2.2   PHOS  --  2.4* 2.9     No results for input(s): ALT, AP, TBIL, TBILI, TP, ALB, GLOB, GGT, AML, LPSE in the last 72 hours. No lab exists for component: SGOT, GPT, AMYP, HLPSE  No results for input(s): INR, PTP, APTT, INREXT, INREXT in the last 72 hours. No results for input(s): FE, TIBC, PSAT, FERR in the last 72 hours. No results found for: FOL, RBCF   No results for input(s): PH, PCO2, PO2 in the last 72 hours. No results for input(s): CPK, CKNDX, TROIQ in the last 72 hours.     No lab exists for component: CPKMB  No results found for: CHOL, CHOLX, CHLST, CHOLV, HDL, HDLP, LDL, LDLC, DLDLP, TGLX, TRIGL, TRIGP, CHHD, CHHDX  No results found for: GLUCPOC  No results found for: COLOR, APPRN, SPGRU, REFSG, GUSTAVO, PROTU, GLUCU, KETU, BILU, UROU, VIRY, LEUKU, GLUKE, EPSU, BACTU, WBCU, RBCU, CASTS, UCRY      Medications Reviewed:     Current Facility-Administered Medications   Medication Dose Route Frequency    sodium chloride (NS) flush 5-40 mL  5-40 mL IntraVENous Q8H    sodium chloride (NS) flush 5-40 mL  5-40 mL IntraVENous PRN    dextrose 5% - 0.45% NaCl with KCl 20 mEq/L infusion  75 mL/hr IntraVENous CONTINUOUS    naloxone (NARCAN) injection 0.2 mg  0.2 mg SubCUTAneous EVERY 2 MINUTES AS NEEDED    gabapentin (NEURONTIN) capsule 600 mg  600 mg Oral TID    escitalopram oxalate (LEXAPRO) tablet 20 mg  20 mg Oral DAILY    furosemide (LASIX) tablet 20 mg  20 mg Oral DAILY    morphine injection 4 mg  4 mg IntraVENous Q3H PRN    ondansetron (ZOFRAN) injection 4 mg  4 mg IntraVENous Q8H PRN    prochlorperazine (COMPAZINE) with saline injection 5 mg  5 mg IntraVENous Q4H PRN    acetaminophen (TYLENOL) tablet 650 mg  650 mg Oral Q4H PRN ______________________________________________________________________  EXPECTED LENGTH OF STAY: 2d 4h  ACTUAL LENGTH OF STAY:          Pam Cortez MD

## 2021-01-21 NOTE — DISCHARGE INSTRUCTIONS
Discharge Instructions       PATIENT ID: Ernst Bob  MRN: [id number]   YOB: 1955    DATE OF ADMISSION: 1/16/2021  8:44 PM    DATE OF DISCHARGE: 1/21/2021    PRIMARY CARE PROVIDER: Lanny Contreras MD     ATTENDING PHYSICIAN: Leidy Tom MD  DISCHARGING PROVIDER: Tima Zamudio MD    To contact this individual call [phone] and ask the  to page. If unavailable ask to be transferred the Adult Hospitalist Department. DISCHARGE DIAGNOSES   Abdominal pain    CONSULTATIONS: IP CONSULT TO GASTROENTEROLOGY  IP CONSULT TO HOSPITALIST    PROCEDURES/SURGERIES: Procedure(s):  COLONOSCOPY   :-    PENDING TEST RESULTS:   At the time of discharge the following test results are still pending: none    FOLLOW UP APPOINTMENTS:   Follow-up Information     Follow up With Specialties Details Why Steffanie Cash MD Family Medicine In 1 week  [address]  [phone]      Primo Blanc MD Gastroenterology In 1 week  [address]  [phone]             ADDITIONAL CARE RECOMMENDATIONS:   Follow up with PMD  Follow up with GI    DIET: Cardiac Diet     ACTIVITY: Activity as tolerated      DISCHARGE MEDICATIONS:   See Medication Reconciliation Form    · It is important that you take the medication exactly as they are prescribed. · Keep your medication in the bottles provided by the pharmacist and keep a list of the medication names, dosages, and times to be taken in your wallet. · Do not take other medications without consulting your doctor. NOTIFY YOUR PHYSICIAN FOR ANY OF THE FOLLOWING:   Fever over 101 degrees for 24 hours. Chest pain, shortness of breath, fever, chills, nausea, vomiting, diarrhea, change in mentation, falling, weakness, bleeding. Severe pain or pain not relieved by medications.   Or, any other signs or symptoms that you may have questions about.      DISPOSITION:  x  Home With:   OT  PT  HH  RN       SNF/Inpatient Rehab/LTAC    Independent/assisted living    Hospice    Other:     CDMP Checked:   Yes x     PROBLEM LIST Updated:  Yes x       Signed:   Daniela Alfredo MD  1/21/2021  9:29 AM

## 2021-01-21 NOTE — PROGRESS NOTES
[address]  [place], [address]       GI PROGRESS NOTE  Will Raquel Subramanian  [phone] office  [phone] NP/PA in-hospital cell phone M-F until 4:30PM  After 5PM or on weekends, please call  for physician on call      NAME: Dylan Up   :  1955   MRN:  [id number]       Subjective:   Patient received an additional 2 Dulcolax suppositories, 2 doses of magnesium citrate, and Relistor yesterday. He had watery, brown movements yesterday and overnight, however still has not cleared. Patient reports lower abdominal pain. No nausea or vomiting. Discussed with RN. Objective:     VITALS:   Last 24hrs VS reviewed since prior progress note. Most recent are:  Visit Vitals  /82   Pulse 77   Temp 98.8 °F (37.1 °C)   Resp 16   Wt 108.2 kg (238 lb 9.6 oz)   SpO2 93%   BMI 27.93 kg/m²       PHYSICAL EXAM:  General: Cooperative, no acute distress    Neurologic:  Alert and oriented  HEENT: EOMI, no scleral icterus   Lungs:  No respiratory distress  Heart:  S1 S2   Abdomen: Soft, distended, mild lower abdominal tenderness, no guarding, no rebound. +Bowel sounds. Extremities: Warm  Psych:   Not anxious or agitated      Lab Data Reviewed:     Recent Results (from the past 24 hour(s))   MAGNESIUM    Collection Time: 21  5:05 AM   Result Value Ref Range    Magnesium 3.0 (H) 1.6 - 2.4 mg/dL       Assessment:   · Lower abdominal pain: redundant colon with anatomical displacement  · Fecal stasis  · Remote history of colon polyps     Patient Active Problem List   Diagnosis Code    Volvulus ([address] K56.2    Abdominal pain R10.9     Plan:   · Discussed with Dr. Reyes Baron. Will hold off on colonoscopy at this time given patient has not cleared. Recommend bowel regimen on discharge to include senna 2 tabs daily, MiraLax 2-4 capfuls daily, Dulcolax suppository daily, and Relistor 450 mg daily.   · Plan for outpatient follow up in 2-3 weeks     Signed By: Jada Santoyo Alyssa Lopez [place]     1/21/2021  9:18 AM

## 2021-01-21 NOTE — DISCHARGE SUMMARY
Discharge Summary       PATIENT ID: Tate Ibarra  MRN: [id number]   YOB: 1955    DATE OF ADMISSION: 1/16/2021  8:44 PM    DATE OF DISCHARGE: 1/21/2021   PRIMARY CARE PROVIDER: Luzmaria Calvert MD     ATTENDING PHYSICIAN: Dr Quincy العراقي  DISCHARGING PROVIDER: Quincy العراقي MD    To contact this individual call [phone] and ask the  to page. If unavailable ask to be transferred the Adult Hospitalist Department. CONSULTATIONS: IP CONSULT TO GASTROENTEROLOGY  IP CONSULT TO HOSPITALIST    PROCEDURES/SURGERIES: Procedure(s):  COLONOSCOPY   :-    [address] COURSE:   Redundant colon with anatomical displacement - no volvulus on repeat CT  - Clear liquids   - morphine IV PRN for pain  - s/p 2 L golytley  - Mag citrate, suppositories and methylnaltrexone 1/20   -Spoke to GI, no plans for colonoscopy, discharge with outpatient follow up     Hx PE  -on coumdin due to PE in 2019, was on NOAC, but failed treatment due to progression of PE while on medication. [facility] was transitioned to heparin, but  Developed HIT.  He the transitioned to argatro  -INR 1.2 and subtherapeutic on admission, hemoccult negative   - restart as outpatient     Hx oropharyngeal CA s/p chemoradiation  -has trouble speaking due to radiation, also can not read or write per wife  -s/p J tube removal     Arachnoiditis  -uses morphine 60mg BID at home for chronic pain  -continue gabapentin 600mg TID  -narcan as needed     LE edema  -continue home lasix     Depression/Anxiety/Insomina  -continue lexapro/ambien     Code status: FULL  DVT prophylaxis: SCDs           DISCHARGE DIAGNOSES / PLAN:      1.   Abdominal pain     ADDITIONAL CARE RECOMMENDATIONS:   Follow up with PMD  Follow up with GI     PENDING TEST RESULTS:   At the time of discharge the following test results are still pending: none    FOLLOW UP APPOINTMENTS:    Follow-up Information     Follow up With Specialties Details Why Contact Tyler Cowan Ramona Epps MD Family Medicine In 1 week  [address]  [phone]      Yolanda Castillo MD Gastroenterology In 1 week  [place] [id number] [phone]               DIET: Cardiac Diet    ACTIVITY: Activity as tolerated      DISCHARGE MEDICATIONS:  Current Discharge Medication List      CONTINUE these medications which have NOT CHANGED    Details   predniSONE (DELTASONE) 20 mg tablet 2 qd for 4 d. 1 1/2 qd for 4 d. 1 qd for 4 d, 1/2 qd for 4 d. Qty: 20 Tab, Refills: 0      gabapentin (NEURONTIN) 600 mg tablet Take 600 mg by mouth three (3) times daily. zolpidem (AMBIEN) 10 mg tablet Take 10 mg by mouth nightly as needed for Sleep.      escitalopram (LEXAPRO) 10 mg tablet Take 10 mg by mouth daily. Morphine (RENY) 60 mg ER capsule Take 60 mg by mouth three (3) times daily. clonazePAM (KLONOPIN) 1 mg tablet Take 1 mg by mouth two (2) times a day. furosemide (LASIX) 20 mg tablet Take 20 mg by mouth two (2) times a day. potassium chloride (KLOR-CON M20) 20 mEq tablet Take 20 mEq by mouth as needed for Other (when taking fluid). triamterene-hydrochlorothiazide (MAXZIDE-25MG) 37.5-25 mg per tablet Take 1 Tab by mouth daily. HYDROcodone-acetaminophen (NORCO) 5-325 mg per tablet 1-2 tabs PO Q 4-6 hrs PRN  Qty: 40 Tab, Refills: 0               NOTIFY YOUR PHYSICIAN FOR ANY OF THE FOLLOWING:   Fever over 101 degrees for 24 hours. Chest pain, shortness of breath, fever, chills, nausea, vomiting, diarrhea, change in mentation, falling, weakness, bleeding. Severe pain or pain not relieved by medications. Or, any other signs or symptoms that you may have questions about.     DISPOSITION:   x Home With:   OT  PT  HH  RN       Long term SNF/Inpatient Rehab    Independent/assisted living    Hospice    Other:       PATIENT CONDITION AT DISCHARGE:     Functional status    Poor Deconditioned    x Independent      Cognition    x Lucid     Forgetful     Dementia      Catheters/lines (plus indication)    Tubbs     PICC     PEG    x None      Code status   x  Full code     DNR      PHYSICAL EXAMINATION AT DISCHARGE:  Please see progress note      CHRONIC MEDICAL DIAGNOSES:  Problem List as of 1/21/2021 Date Reviewed: 1/21/2021          Codes Class Noted - Resolved    Abdominal pain ICD-10-CM: R10.9  ICD-9-CM: 789.00  1/18/2021 - Present        * (Principal) Volvulus ([facility] [address] ICD-10-CM: K56.2  ICD-9-CM: 560.2  1/17/2021 - Present              Greater than 36 minutes were spent with the patient on counseling and coordination of care    Signed:   Lala Mills MD  1/21/2021  9:30 AM   .

## 2021-01-21 NOTE — PROGRESS NOTES
Transitions of Care plan: Home today with family and personal care    -RUR: 11%  -Patient will be discharging home today, wife to transport. -Medicare pt has received, reviewed, and signed 2nd IM letter informing them of their right to appeal the discharge. Signed copied has been placed on pt bedside chart.     Terri HAMILTON, ACM-SW

## 2021-01-21 NOTE — PROGRESS NOTES
11:54 AM  Attempted to call patient's wife about his discharge, phone # on file stated the message \"unable to receive calls at this time\" when dialed. 12:26 PM  Attempted to call wife again and still got the same \"unable to receive calls\" message, home phone on file busy when dialed.

## 2021-01-21 NOTE — PROGRESS NOTES
Bedside shift change report given to Sarah Caceres (oncoming nurse) by Natali Nieves (offgoing nurse). Report included the following information SBAR, Kardex and MAR.

## 2021-01-22 ENCOUNTER — APPOINTMENT (OUTPATIENT)
Dept: GENERAL RADIOLOGY | Age: 66
DRG: 392 | End: 2021-01-22
Attending: INTERNAL MEDICINE
Payer: MEDICARE

## 2021-01-22 ENCOUNTER — APPOINTMENT (OUTPATIENT)
Dept: CT IMAGING | Age: 66
DRG: 392 | End: 2021-01-22
Attending: EMERGENCY MEDICINE
Payer: MEDICARE

## 2021-01-22 ENCOUNTER — HOSPITAL ENCOUNTER (INPATIENT)
Age: 66
LOS: 5 days | Discharge: HOME | DRG: 392 | End: 2021-01-27
Attending: EMERGENCY MEDICINE | Admitting: INTERNAL MEDICINE
Payer: MEDICARE

## 2021-01-22 DIAGNOSIS — Q43.8 REDUNDANT COLON: ICD-10-CM

## 2021-01-22 DIAGNOSIS — R93.3 MULTIPLE AIR FLUID LEVELS OF SMALL INTESTINE DETERMINED BY X-RAY: ICD-10-CM

## 2021-01-22 DIAGNOSIS — R10.9 INTRACTABLE ABDOMINAL PAIN: Primary | ICD-10-CM

## 2021-01-22 LAB
ALBUMIN SERPL-MCNC: 4.2 G/DL (ref 3.5–5)
ALBUMIN/GLOB SERPL: 1 {RATIO} (ref 1.1–2.2)
ALP SERPL-CCNC: 132 U/L (ref 45–117)
ALT SERPL-CCNC: 18 U/L (ref 12–78)
AMPHET UR QL SCN: NEGATIVE
ANION GAP SERPL CALC-SCNC: 6 MMOL/L (ref 5–15)
AST SERPL-CCNC: 17 U/L (ref 15–37)
BARBITURATES UR QL SCN: NEGATIVE
BASOPHILS # BLD: 0.1 K/UL (ref 0–0.1)
BASOPHILS NFR BLD: 1 % (ref 0–1)
BENZODIAZ UR QL: NEGATIVE
BILIRUB SERPL-MCNC: 0.6 MG/DL (ref 0.2–1)
BUN SERPL-MCNC: 9 MG/DL (ref 6–20)
BUN/CREAT SERPL: 9 (ref 12–20)
CALCIUM SERPL-MCNC: 9.2 MG/DL (ref 8.5–10.1)
CANNABINOIDS UR QL SCN: NEGATIVE
CHLORIDE SERPL-SCNC: 107 MMOL/L (ref 97–108)
CO2 SERPL-SCNC: 28 MMOL/L (ref 21–32)
COCAINE UR QL SCN: NEGATIVE
COMMENT, HOLDF: NORMAL
CREAT SERPL-MCNC: 0.95 MG/DL (ref 0.7–1.3)
DIFFERENTIAL METHOD BLD: ABNORMAL
DRUG SCRN COMMENT,DRGCM: ABNORMAL
EOSINOPHIL # BLD: 0.2 K/UL (ref 0–0.4)
EOSINOPHIL NFR BLD: 2 % (ref 0–7)
ERYTHROCYTE [DISTWIDTH] IN BLOOD BY AUTOMATED COUNT: 14 % (ref 11.5–14.5)
GLOBULIN SER CALC-MCNC: 4.4 G/DL (ref 2–4)
GLUCOSE SERPL-MCNC: 120 MG/DL (ref 65–100)
HCT VFR BLD AUTO: 46.4 % (ref 36.6–50.3)
HGB BLD-MCNC: 15.7 G/DL (ref 12.1–17)
IMM GRANULOCYTES # BLD AUTO: 0 K/UL (ref 0–0.04)
IMM GRANULOCYTES NFR BLD AUTO: 0 % (ref 0–0.5)
LACTATE SERPL-SCNC: 0.8 MMOL/L (ref 0.4–2)
LIPASE SERPL-CCNC: 48 U/L (ref 73–393)
LYMPHOCYTES # BLD: 0.6 K/UL (ref 0.8–3.5)
LYMPHOCYTES NFR BLD: 7 % (ref 12–49)
MCH RBC QN AUTO: 29.9 PG (ref 26–34)
MCHC RBC AUTO-ENTMCNC: 33.8 G/DL (ref 30–36.5)
MCV RBC AUTO: 88.4 FL (ref 80–99)
METHADONE UR QL: NEGATIVE
MONOCYTES # BLD: 0.5 K/UL (ref 0–1)
MONOCYTES NFR BLD: 6 % (ref 5–13)
NEUTS SEG # BLD: 7.4 K/UL (ref 1.8–8)
NEUTS SEG NFR BLD: 84 % (ref 32–75)
NRBC # BLD: 0 K/UL (ref 0–0.01)
NRBC BLD-RTO: 0 PER 100 WBC
OPIATES UR QL: POSITIVE
PCP UR QL: NEGATIVE
PLATELET # BLD AUTO: 200 K/UL (ref 150–400)
PMV BLD AUTO: 11.2 FL (ref 8.9–12.9)
POTASSIUM SERPL-SCNC: 3.8 MMOL/L (ref 3.5–5.1)
PROT SERPL-MCNC: 8.6 G/DL (ref 6.4–8.2)
RBC # BLD AUTO: 5.25 M/UL (ref 4.1–5.7)
RBC MORPH BLD: ABNORMAL
SAMPLES BEING HELD,HOLD: NORMAL
SODIUM SERPL-SCNC: 141 MMOL/L (ref 136–145)
WBC # BLD AUTO: 8.8 K/UL (ref 4.1–11.1)

## 2021-01-22 PROCEDURE — 71045 X-RAY EXAM CHEST 1 VIEW: CPT

## 2021-01-22 PROCEDURE — 80307 DRUG TEST PRSMV CHEM ANLYZR: CPT

## 2021-01-22 PROCEDURE — 74177 CT ABD & PELVIS W/CONTRAST: CPT

## 2021-01-22 PROCEDURE — 85025 COMPLETE CBC W/AUTO DIFF WBC: CPT

## 2021-01-22 PROCEDURE — 74011250636 HC RX REV CODE- 250/636: Performed by: EMERGENCY MEDICINE

## 2021-01-22 PROCEDURE — 74011250636 HC RX REV CODE- 250/636: Performed by: STUDENT IN AN ORGANIZED HEALTH CARE EDUCATION/TRAINING PROGRAM

## 2021-01-22 PROCEDURE — 99285 EMERGENCY DEPT VISIT HI MDM: CPT

## 2021-01-22 PROCEDURE — 96375 TX/PRO/DX INJ NEW DRUG ADDON: CPT

## 2021-01-22 PROCEDURE — 74011250637 HC RX REV CODE- 250/637: Performed by: INTERNAL MEDICINE

## 2021-01-22 PROCEDURE — 80053 COMPREHEN METABOLIC PANEL: CPT

## 2021-01-22 PROCEDURE — 36415 COLL VENOUS BLD VENIPUNCTURE: CPT

## 2021-01-22 PROCEDURE — 74011000636 HC RX REV CODE- 636: Performed by: EMERGENCY MEDICINE

## 2021-01-22 PROCEDURE — 94760 N-INVAS EAR/PLS OXIMETRY 1: CPT

## 2021-01-22 PROCEDURE — 83605 ASSAY OF LACTIC ACID: CPT

## 2021-01-22 PROCEDURE — 65270000029 HC RM PRIVATE

## 2021-01-22 PROCEDURE — 96374 THER/PROPH/DIAG INJ IV PUSH: CPT

## 2021-01-22 PROCEDURE — 74011250636 HC RX REV CODE- 250/636: Performed by: INTERNAL MEDICINE

## 2021-01-22 PROCEDURE — 83690 ASSAY OF LIPASE: CPT

## 2021-01-22 PROCEDURE — 77030027138 HC INCENT SPIROMETER -A

## 2021-01-22 RX ORDER — POLYETHYLENE GLYCOL 3350 17 G/17G
17 POWDER, FOR SOLUTION ORAL DAILY PRN
Status: DISCONTINUED | OUTPATIENT
Start: 2021-01-22 | End: 2021-01-27 | Stop reason: HOSPADM

## 2021-01-22 RX ORDER — MORPHINE SULFATE 2 MG/ML
2 INJECTION, SOLUTION INTRAMUSCULAR; INTRAVENOUS
Status: DISCONTINUED | OUTPATIENT
Start: 2021-01-22 | End: 2021-01-27 | Stop reason: HOSPADM

## 2021-01-22 RX ORDER — SODIUM CHLORIDE 0.9 % (FLUSH) 0.9 %
5-40 SYRINGE (ML) INJECTION EVERY 8 HOURS
Status: DISCONTINUED | OUTPATIENT
Start: 2021-01-22 | End: 2021-01-27 | Stop reason: HOSPADM

## 2021-01-22 RX ORDER — TRIAMTERENE/HYDROCHLOROTHIAZID 37.5-25 MG
1 TABLET ORAL DAILY
Status: DISCONTINUED | OUTPATIENT
Start: 2021-01-23 | End: 2021-01-27 | Stop reason: HOSPADM

## 2021-01-22 RX ORDER — HYDROCODONE BITARTRATE AND ACETAMINOPHEN 5; 325 MG/1; MG/1
1 TABLET ORAL
Status: DISCONTINUED | OUTPATIENT
Start: 2021-01-22 | End: 2021-01-27 | Stop reason: HOSPADM

## 2021-01-22 RX ORDER — ACETAMINOPHEN 325 MG/1
650 TABLET ORAL
Status: DISCONTINUED | OUTPATIENT
Start: 2021-01-22 | End: 2021-01-27 | Stop reason: HOSPADM

## 2021-01-22 RX ORDER — ONDANSETRON 2 MG/ML
4 INJECTION INTRAMUSCULAR; INTRAVENOUS
Status: DISCONTINUED | OUTPATIENT
Start: 2021-01-22 | End: 2021-01-27 | Stop reason: HOSPADM

## 2021-01-22 RX ORDER — HYDROMORPHONE HYDROCHLORIDE 1 MG/ML
1 INJECTION, SOLUTION INTRAMUSCULAR; INTRAVENOUS; SUBCUTANEOUS
Status: COMPLETED | OUTPATIENT
Start: 2021-01-22 | End: 2021-01-22

## 2021-01-22 RX ORDER — ONDANSETRON 2 MG/ML
4 INJECTION INTRAMUSCULAR; INTRAVENOUS
Status: COMPLETED | OUTPATIENT
Start: 2021-01-22 | End: 2021-01-22

## 2021-01-22 RX ORDER — MORPHINE SULFATE 10 MG/ML
4 INJECTION, SOLUTION INTRAMUSCULAR; INTRAVENOUS ONCE
Status: COMPLETED | OUTPATIENT
Start: 2021-01-22 | End: 2021-01-22

## 2021-01-22 RX ORDER — SODIUM CHLORIDE 0.9 % (FLUSH) 0.9 %
5-40 SYRINGE (ML) INJECTION AS NEEDED
Status: DISCONTINUED | OUTPATIENT
Start: 2021-01-22 | End: 2021-01-27 | Stop reason: HOSPADM

## 2021-01-22 RX ORDER — SODIUM CHLORIDE, SODIUM LACTATE, POTASSIUM CHLORIDE, CALCIUM CHLORIDE 600; 310; 30; 20 MG/100ML; MG/100ML; MG/100ML; MG/100ML
75 INJECTION, SOLUTION INTRAVENOUS CONTINUOUS
Status: DISCONTINUED | OUTPATIENT
Start: 2021-01-22 | End: 2021-01-27 | Stop reason: HOSPADM

## 2021-01-22 RX ORDER — PROMETHAZINE HYDROCHLORIDE 25 MG/1
12.5 TABLET ORAL
Status: DISCONTINUED | OUTPATIENT
Start: 2021-01-22 | End: 2021-01-27 | Stop reason: HOSPADM

## 2021-01-22 RX ORDER — CLONAZEPAM 1 MG/1
1 TABLET ORAL 2 TIMES DAILY
Status: DISCONTINUED | OUTPATIENT
Start: 2021-01-23 | End: 2021-01-27 | Stop reason: HOSPADM

## 2021-01-22 RX ORDER — ACETAMINOPHEN 650 MG/1
650 SUPPOSITORY RECTAL
Status: DISCONTINUED | OUTPATIENT
Start: 2021-01-22 | End: 2021-01-27 | Stop reason: HOSPADM

## 2021-01-22 RX ORDER — GABAPENTIN 600 MG/1
600 TABLET ORAL 3 TIMES DAILY
Status: DISCONTINUED | OUTPATIENT
Start: 2021-01-22 | End: 2021-01-27 | Stop reason: HOSPADM

## 2021-01-22 RX ORDER — ESCITALOPRAM OXALATE 10 MG/1
10 TABLET ORAL DAILY
Status: DISCONTINUED | OUTPATIENT
Start: 2021-01-23 | End: 2021-01-27 | Stop reason: HOSPADM

## 2021-01-22 RX ADMIN — MORPHINE SULFATE 4 MG: 10 INJECTION INTRAVENOUS at 15:09

## 2021-01-22 RX ADMIN — MORPHINE SULFATE 2 MG: 2 INJECTION, SOLUTION INTRAMUSCULAR; INTRAVENOUS at 22:44

## 2021-01-22 RX ADMIN — GABAPENTIN 600 MG: 600 TABLET, FILM COATED ORAL at 21:28

## 2021-01-22 RX ADMIN — IOPAMIDOL 100 ML: 755 INJECTION, SOLUTION INTRAVENOUS at 18:50

## 2021-01-22 RX ADMIN — Medication 10 ML: at 22:00

## 2021-01-22 RX ADMIN — ONDANSETRON 4 MG: 2 INJECTION INTRAMUSCULAR; INTRAVENOUS at 15:09

## 2021-01-22 RX ADMIN — HYDROMORPHONE HYDROCHLORIDE 1 MG: 1 INJECTION, SOLUTION INTRAMUSCULAR; INTRAVENOUS; SUBCUTANEOUS at 19:05

## 2021-01-22 RX ADMIN — SODIUM CHLORIDE, POTASSIUM CHLORIDE, SODIUM LACTATE AND CALCIUM CHLORIDE 75 ML/HR: 600; 310; 30; 20 INJECTION, SOLUTION INTRAVENOUS at 21:20

## 2021-01-22 NOTE — ED PROVIDER NOTES
55-year-old gentleman with history of asthma, hypertension and recent hospitalization with concern for sigmoid volvulus presents the emerge department today with continued abdominal pain. There was a plan for colonoscopy to try to relieve the volvulus which never occurred due to insufficient bowel prep. His abdominal pain has been consistent and return after his morphine wore off after his discharge from the hospital yesterday. The history is provided by the patient, a relative and medical records. Abdominal Pain   This is a recurrent problem. The current episode started more than 1 week ago. The problem occurs constantly. The problem has not changed since onset. The pain is located in the generalized abdominal region. The pain is severe. Associated symptoms include nausea. Pertinent negatives include no fever, no diarrhea, no melena, no vomiting, no dysuria, no frequency, no headaches, no arthralgias, no myalgias, no trauma, no chest pain and no back pain. Past Medical History:   Diagnosis Date    Asthma 2017    Hypertension     Other ill-defined conditions(799.89)     kidney stones       Past Surgical History:   Procedure Laterality Date    HX APPENDECTOMY      HX BACK SURGERY      L5 - S1    HX CHOLECYSTECTOMY           History reviewed. No pertinent family history.     Social History     Socioeconomic History    Marital status:      Spouse name: Not on file    Number of children: Not on file    Years of education: Not on file    Highest education level: Not on file   Occupational History    Not on file   Social Needs    Financial resource strain: Not on file    Food insecurity     Worry: Not on file     Inability: Not on file    Transportation needs     Medical: Not on file     Non-medical: Not on file   Tobacco Use    Smoking status: Never Smoker    Smokeless tobacco: Never Used   Substance and Sexual Activity    Alcohol use: No    Drug use: No    Sexual activity: Not on file   Lifestyle    Physical activity     Days per week: Not on file     Minutes per session: Not on file    Stress: Not on file   Relationships    Social connections     Talks on phone: Not on file     Gets together: Not on file     Attends Hinduism service: Not on file     Active member of club or organization: Not on file     Attends meetings of clubs or organizations: Not on file     Relationship status: Not on file    Intimate partner violence     Fear of current or ex partner: Not on file     Emotionally abused: Not on file     Physically abused: Not on file     Forced sexual activity: Not on file   Other Topics Concern    Not on file   Social History Narrative    Not on file         ALLERGIES: Heparin    Review of Systems   Constitutional: Negative for fatigue and fever. HENT: Negative for sneezing and sore throat. Respiratory: Negative for cough and shortness of breath. Cardiovascular: Negative for chest pain and leg swelling. Gastrointestinal: Positive for abdominal pain and nausea. Negative for diarrhea, melena and vomiting. Genitourinary: Negative for difficulty urinating, dysuria and frequency. Musculoskeletal: Negative for arthralgias, back pain and myalgias. Skin: Negative for color change and rash. Neurological: Negative for weakness and headaches. Psychiatric/Behavioral: Negative for agitation and behavioral problems. Vitals:    01/22/21 1327   BP: 120/82   Pulse: 94   Resp: 18   Temp: 97.1 °F (36.2 °C)   SpO2: 98%            Physical Exam  Vitals signs and nursing note reviewed. Constitutional:       General: He is not in acute distress. Appearance: He is ill-appearing. He is not toxic-appearing or diaphoretic. HENT:      Head: Normocephalic and atraumatic. Nose: Nose normal.      Mouth/Throat:      Mouth: Mucous membranes are dry. Pharynx: Oropharynx is clear. Eyes:      Extraocular Movements: Extraocular movements intact. Conjunctiva/sclera: Conjunctivae normal.      Pupils: Pupils are equal, round, and reactive to light. Neck:      Musculoskeletal: Normal range of motion and neck supple. No muscular tenderness. Cardiovascular:      Rate and Rhythm: Normal rate and regular rhythm. Pulses: Normal pulses. Heart sounds: Normal heart sounds. Pulmonary:      Effort: Pulmonary effort is normal. No respiratory distress. Breath sounds: Normal breath sounds. Abdominal:      General: There is distension. Palpations: Abdomen is soft. Tenderness: There is generalized abdominal tenderness. There is guarding (Voluntary). There is no rebound. Musculoskeletal: Normal range of motion. General: No swelling, tenderness, deformity or signs of injury. Right lower leg: No edema. Left lower leg: No edema. Skin:     General: Skin is warm and dry. Capillary Refill: Capillary refill takes less than 2 seconds. Neurological:      General: No focal deficit present. Mental Status: He is alert and oriented to person, place, and time. Psychiatric:         Mood and Affect: Mood normal.         Behavior: Behavior normal.          MDM  Number of Diagnoses or Management Options  Diagnosis management comments: 57-year-old gentleman presents as above with abdominal pain. He has significant tenderness on exam.  Plan to obtain labs and CT. I anticipate admission to the hospital.       Amount and/or Complexity of Data Reviewed  Clinical lab tests: reviewed  Decide to obtain previous medical records or to obtain history from someone other than the patient: yes           Procedures        6:00 PM  Change of shift. Care of patient signed over to Dr. Cherie Martinez pending CT scan to evaluate for potential obstruction or other pathology. That he not have a surgical problem on CT I anticipate admission to the hospitalist.  Handoff complete.    6:01 PM  Change of shift.  Care of patient taken over from Dr. Nathan Hdez; H&P reviewed, handoff complete. Awaiting labs/imaging/consultant. Perfect Serve Consult for Admission  7:14 PM    ED Room Number: MH69/67  Patient Name and age:  Avery Doherty 77 y.o.  male  Working Diagnosis:   1. Intractable abdominal pain    2. Multiple air fluid levels of small intestine determined by X-ray    3. Redundant colon        COVID-19 Suspicion:  no  Sepsis present:  no  Reassessment needed: no  Code Status:  Full Code  Readmission: yes  Isolation Requirements:  no  Recommended Level of Care:  med/surg  Department:[facility] Adult ED - 21   Other:  Outpatient colonoscopy canceled, pt now clear after bowel prep. Requiring multiple rounds of pain meds without relief.

## 2021-01-22 NOTE — ED TRIAGE NOTES
Triage: Pt arrives from home with CC of abdominal pain. Pt recently discharged from [facility]. He was supposed to get a colonoscopy but didn't have successful bowel prep inpatient so he was sent home to do continued bowel prep. Per his wife he is now have clear BMs. She also reports he is having continued abdominal pain like he had when he was admitted. They were unable to seen outpatient due to insurance problems.

## 2021-01-23 LAB
ALBUMIN SERPL-MCNC: 3.6 G/DL (ref 3.5–5)
ALBUMIN/GLOB SERPL: 0.9 {RATIO} (ref 1.1–2.2)
ALP SERPL-CCNC: 112 U/L (ref 45–117)
ALT SERPL-CCNC: 16 U/L (ref 12–78)
ANION GAP SERPL CALC-SCNC: 6 MMOL/L (ref 5–15)
AST SERPL-CCNC: 14 U/L (ref 15–37)
BASOPHILS # BLD: 0.1 K/UL (ref 0–0.1)
BASOPHILS NFR BLD: 1 % (ref 0–1)
BILIRUB SERPL-MCNC: 0.6 MG/DL (ref 0.2–1)
BUN SERPL-MCNC: 8 MG/DL (ref 6–20)
BUN/CREAT SERPL: 9 (ref 12–20)
CALCIUM SERPL-MCNC: 8.7 MG/DL (ref 8.5–10.1)
CHLORIDE SERPL-SCNC: 106 MMOL/L (ref 97–108)
CO2 SERPL-SCNC: 29 MMOL/L (ref 21–32)
CREAT SERPL-MCNC: 0.92 MG/DL (ref 0.7–1.3)
DIFFERENTIAL METHOD BLD: NORMAL
EOSINOPHIL # BLD: 0.2 K/UL (ref 0–0.4)
EOSINOPHIL NFR BLD: 3 % (ref 0–7)
ERYTHROCYTE [DISTWIDTH] IN BLOOD BY AUTOMATED COUNT: 13.9 % (ref 11.5–14.5)
GLOBULIN SER CALC-MCNC: 3.8 G/DL (ref 2–4)
GLUCOSE SERPL-MCNC: 85 MG/DL (ref 65–100)
HCT VFR BLD AUTO: 41.4 % (ref 36.6–50.3)
HGB BLD-MCNC: 13.9 G/DL (ref 12.1–17)
IMM GRANULOCYTES # BLD AUTO: 0 K/UL (ref 0–0.04)
IMM GRANULOCYTES NFR BLD AUTO: 0 % (ref 0–0.5)
LIPASE SERPL-CCNC: 33 U/L (ref 73–393)
LYMPHOCYTES # BLD: 1.2 K/UL (ref 0.8–3.5)
LYMPHOCYTES NFR BLD: 14 % (ref 12–49)
MAGNESIUM SERPL-MCNC: 2.4 MG/DL (ref 1.6–2.4)
MCH RBC QN AUTO: 29.8 PG (ref 26–34)
MCHC RBC AUTO-ENTMCNC: 33.6 G/DL (ref 30–36.5)
MCV RBC AUTO: 88.8 FL (ref 80–99)
MONOCYTES # BLD: 0.7 K/UL (ref 0–1)
MONOCYTES NFR BLD: 9 % (ref 5–13)
NEUTS SEG # BLD: 6.4 K/UL (ref 1.8–8)
NEUTS SEG NFR BLD: 73 % (ref 32–75)
NRBC # BLD: 0 K/UL (ref 0–0.01)
NRBC BLD-RTO: 0 PER 100 WBC
PHOSPHATE SERPL-MCNC: 2.4 MG/DL (ref 2.6–4.7)
PLATELET # BLD AUTO: 237 K/UL (ref 150–400)
PMV BLD AUTO: 10 FL (ref 8.9–12.9)
POTASSIUM SERPL-SCNC: 3.8 MMOL/L (ref 3.5–5.1)
PROT SERPL-MCNC: 7.4 G/DL (ref 6.4–8.2)
RBC # BLD AUTO: 4.66 M/UL (ref 4.1–5.7)
SODIUM SERPL-SCNC: 141 MMOL/L (ref 136–145)
TSH SERPL DL<=0.05 MIU/L-ACNC: 4.4 UIU/ML (ref 0.36–3.74)
WBC # BLD AUTO: 8.6 K/UL (ref 4.1–11.1)

## 2021-01-23 PROCEDURE — 80053 COMPREHEN METABOLIC PANEL: CPT

## 2021-01-23 PROCEDURE — 83735 ASSAY OF MAGNESIUM: CPT

## 2021-01-23 PROCEDURE — 85025 COMPLETE CBC W/AUTO DIFF WBC: CPT

## 2021-01-23 PROCEDURE — 83690 ASSAY OF LIPASE: CPT

## 2021-01-23 PROCEDURE — 36415 COLL VENOUS BLD VENIPUNCTURE: CPT

## 2021-01-23 PROCEDURE — 84100 ASSAY OF PHOSPHORUS: CPT

## 2021-01-23 PROCEDURE — 84443 ASSAY THYROID STIM HORMONE: CPT

## 2021-01-23 PROCEDURE — 74011250636 HC RX REV CODE- 250/636: Performed by: NURSE PRACTITIONER

## 2021-01-23 PROCEDURE — 65270000029 HC RM PRIVATE

## 2021-01-23 PROCEDURE — 74011250636 HC RX REV CODE- 250/636: Performed by: INTERNAL MEDICINE

## 2021-01-23 PROCEDURE — 74011250637 HC RX REV CODE- 250/637: Performed by: INTERNAL MEDICINE

## 2021-01-23 RX ORDER — MORPHINE SULFATE 2 MG/ML
1 INJECTION, SOLUTION INTRAMUSCULAR; INTRAVENOUS ONCE
Status: COMPLETED | OUTPATIENT
Start: 2021-01-23 | End: 2021-01-23

## 2021-01-23 RX ADMIN — MORPHINE SULFATE 2 MG: 2 INJECTION, SOLUTION INTRAMUSCULAR; INTRAVENOUS at 12:54

## 2021-01-23 RX ADMIN — Medication 10 ML: at 05:19

## 2021-01-23 RX ADMIN — CLONAZEPAM 1 MG: 1 TABLET ORAL at 09:05

## 2021-01-23 RX ADMIN — SODIUM CHLORIDE, POTASSIUM CHLORIDE, SODIUM LACTATE AND CALCIUM CHLORIDE 75 ML/HR: 600; 310; 30; 20 INJECTION, SOLUTION INTRAVENOUS at 12:54

## 2021-01-23 RX ADMIN — HYDROCODONE BITARTRATE AND ACETAMINOPHEN 1 TABLET: 5; 325 TABLET ORAL at 19:04

## 2021-01-23 RX ADMIN — TRIAMTERENE AND HYDROCHLOROTHIAZIDE 1 TABLET: 37.5; 25 TABLET ORAL at 09:05

## 2021-01-23 RX ADMIN — MORPHINE SULFATE 1 MG: 2 INJECTION, SOLUTION INTRAMUSCULAR; INTRAVENOUS at 20:41

## 2021-01-23 RX ADMIN — GABAPENTIN 600 MG: 600 TABLET, FILM COATED ORAL at 09:05

## 2021-01-23 RX ADMIN — ESCITALOPRAM OXALATE 10 MG: 10 TABLET ORAL at 09:05

## 2021-01-23 RX ADMIN — CLONAZEPAM 1 MG: 1 TABLET ORAL at 17:35

## 2021-01-23 RX ADMIN — HYDROCODONE BITARTRATE AND ACETAMINOPHEN 1 TABLET: 5; 325 TABLET ORAL at 23:25

## 2021-01-23 RX ADMIN — MORPHINE SULFATE 2 MG: 2 INJECTION, SOLUTION INTRAMUSCULAR; INTRAVENOUS at 22:24

## 2021-01-23 RX ADMIN — GABAPENTIN 600 MG: 600 TABLET, FILM COATED ORAL at 17:35

## 2021-01-23 RX ADMIN — GABAPENTIN 600 MG: 600 TABLET, FILM COATED ORAL at 21:24

## 2021-01-23 RX ADMIN — MORPHINE SULFATE 2 MG: 2 INJECTION, SOLUTION INTRAMUSCULAR; INTRAVENOUS at 17:35

## 2021-01-23 RX ADMIN — Medication 10 ML: at 21:24

## 2021-01-23 NOTE — PROGRESS NOTES
Problem: Falls - Risk of  Goal: *Absence of Falls  Description: Document Saravanan Cease Fall Risk and appropriate interventions in the flowsheet.   Outcome: Progressing Towards Goal  Note: Fall Risk Interventions:  Mobility Interventions: Utilize walker, cane, or other assistive device

## 2021-01-23 NOTE — PROGRESS NOTES
200- patient's wife called to the nursing station very upset that her  is in severe pain, and they are not happy that his pain is not well controlled. RN explained that patient was receiving his first dose of morphine at noon, and another dose 4.5 hours afterwards. She was not satisfied with this regimen, and demands that the pain medication is scheduled with less time intervals in between. Meanwhile, on hourly rounds in between pain medication patient was found comfortably resting including sleeping heavily. RN was not aware of patient's pain severity, due to patient never communicating severe pain level to the primary nurse. Per wife, he was crying to her on the phone and saying that his pain is not controlled and he has to ask for pain medication. RN assured the wife that she will call the Doctor, and ask for a better pain regimen, even though, RN explained that she can alternate 2mg Morphine IV Q4H PRN with Percocet 5-325 mg Q4 PRN. Wife appeared upset still on the phone, and said that will not be enough, and it would not cover his pain medication schedule that he does at home (see med rec). The last she said, is that she is very upset with the patient care, and that she is going to come and take him home. RN was trying to explain that she will contact the doctor and make sure that the pain will be taken under control, but the wife hung up.

## 2021-01-23 NOTE — ROUTINE PROCESS
Occupational Therapy Screening: 
Services are not indicated at this time. [facility] therapy based on results obtained during the nursing admission assessment. The patients chart was reviewed and the patient is not appropriate for a skilled therapy evaluation at this time. Please consult occupational therapy if any therapy needs arise. Thank you.  
 
Osmel Servin OT

## 2021-01-23 NOTE — PROGRESS NOTES
Orthopedic Spine Nursing Dual Skin Assessment      Primary Nurse David Velasquez RN and Nani Dexter RN performed a dual skin assessment on patient Samara Lim, admitted on 1/22/2021  1:55 PM, found with No impairment noted, and with a Kevin Score: 21. Wound care consult will be placed if appropriate.       Signed by: David Velasquez RN, BSN, CMSRN                       1/22/2021 at 11:11 PM

## 2021-01-23 NOTE — H&P
[facility]  HISTORY AND PHYSICAL    Name:  KEERTHI LOPEZ  MR#:  [id number]  :  1955  ACCOUNT #:  [id number]  ADMIT DATE:  2021      The patient was seen, evaluated and admitted by me on 2021.    PRIMARY CARE PHYSICIAN:  Naa Cowan MD    SOURCE OF INFORMATION:  The patient, the patient's spouse who was present at the bedside, and review of ED and old medical records.    CHIEF COMPLAINT:  Abdominal pain.    HISTORY OF PRESENT ILLNESS:  This is a 65-year-old man with a past medical history significant for hypertension; anxiety/depression; oropharyngeal cancer, status post chemoradiation treatment, complicated by difficulty with speech, was in his usual state of health until about a week ago when the patient developed abdominal pain.  The abdominal pain is diffuse in location, constant, sharp pain with no known aggravating or relieving factors, associated with nausea.  The patient and the spouse described the abdominal pain as severe, 10/10 in severity.  No associated fever, rigors, or chills.  No diarrhea.  He was admitted to this hospital on 2021.  The patient was admitted for about 5 days.  The abdominal pain was attributed to suspected sigmoid volvulus.  Gastroenterology consult was requested.  The plan was for the patient to have colonoscopy done.  Bowel preparation was started and after 5 days of hospitalization, according to the report, the bowel preparation was not adequate and the patient was discharged home for continuation of bowel preparation at home and for outpatient colonoscopy.  The patient's spouse stated that the patient finally finished the bowel prep and called the gastroenterologist for the outpatient colonoscopy, but she was told by the gastroenterologist's office that the patient's insurance is not acceptable.  The patient's spouse stated that she called other gastroenterologist and she was told the same thing that the patient's insurance is  not acceptable and she was subsequently advised to come back to the hospital so that she can have the colonoscopy done as an inpatient. Because of that, the patient came to the emergency room at [address].  A repeat CT scan of the abdomen and pelvis shows the same finding, which is suggestive of sigmoid volvulus. The hospitalist service was asked to admit the patient to the hospital.  The patient has oropharyngeal cancer, status post chemoradiation and since then, the patient has not been able to speak. I communicated with the patient through his spouse who was present at the bedside. PAST MEDICAL HISTORY:  Anxiety/depression; hypertension; oropharyngeal cancer, status post chemoradiation. ALLERGIES:  THE PATIENT IS ALLERGIC TO HEPARIN. MEDICATIONS:  Klonopin 1 mg twice daily, Colace 100 mg twice daily, Lexapro 10 mg daily, Lasix 20 mg twice daily, Neurontin 600 mg 3 times daily, Norco 5/325 one to two tablets every 4-6 hours as needed for pain, Relistor 150 mg 3 tablets daily, morphine ER 60 mg 3 times daily, MiraLax 17 g daily, potassium chloride 20 mEq daily, tapering dose of prednisone, Maxzide 37.5/25 one tablet daily, Ambien 10 mg daily as needed for sleep. FAMILY HISTORY:  This was reviewed. His father had hypertension. PAST SURGICAL HISTORY:  This is significant for appendectomy, back surgery, cholecystectomy, PEG tube placement and removal, placement of vascular access and removal for chemotherapy. SOCIAL HISTORY:  No history of alcohol or tobacco abuse. REVIEW OF SYSTEMS:  HEAD, EYES, EARS, NOSE AND THROAT:  No headache, no dizziness, no blurring of vision, no photophobia. RESPIRATORY SYSTEM:  No cough, no shortness of breath, no hemoptysis. CARDIOVASCULAR SYSTEM:  No chest pain, no orthopnea, no palpitation. GASTROINTESTINAL SYSTEM:  This is positive for abdominal pain and nausea. No vomiting, no diarrhea, no constipation.   GENITOURINARY SYSTEM:  No dysuria, no urgency, and no frequency. All other systems are reviewed and they are negative. PHYSICAL EXAMINATION:  GENERAL APPEARANCE:  The patient appeared ill, in moderate distress. VITAL SIGNS:  On arrival at the emergency room, temperature 97.7, pulse 94, respiratory rate 18, blood pressure 120/82, oxygen saturation 98% on room air. HEENT:  Head:  Normocephalic, atraumatic. Eyes:  Normal eye movement. No redness, no drainage, no discharge. Ears:  Normal external ears with no evidence of drainage. Nose:  No deformity, no drainage. Mouth and Throat:  No visible oral lesion. Dry oral mucosa. NECK:  Neck is supple. No JVD, no thyromegaly. CHEST:  Clear breath sounds. No wheezing, no crackles. HEART:  Normal S1 and S2, regular. No clinically appreciable murmur. ABDOMEN:  Diffuse tenderness. No rebound tenderness. No guarding. Normal bowel sounds. CNS:  Alert, oriented x3. Difficulty with speech noted. EXTREMITIES:  No edema. Pulses 2+ bilaterally. MUSCULOSKELETAL SYSTEM:  No evidence of joint deformity or swelling. SKIN:  No active skin lesions seen in the exposed part of the body. PSYCHIATRY:  Normal mood and affect. LYMPHATIC SYSTEM:  No cervical lymphadenopathy. DIAGNOSTIC DATA:  CT scan of the abdomen and pelvis shows findings suggestive of possible sigmoid volvulus. LABORATORY DATA:  Hematology:  WBC 8.8, hemoglobin 15.7, hematocrit 46.4, platelets 780. Lipase level 48. Chemistry:  Sodium 141, potassium 3.8, chloride 107, CO2 28, glucose 120, BUN 9, creatinine 0.95, calcium 9.2, total bilirubin 0.6, ALT 18, AST 17, alkaline phosphatase 132, total protein 8.6, albumin level 4.2, globulin 4.4. Lactic acid level 0.8. ASSESSMENT:  1. Intractable abdominal pain. 2.  Suspected sigmoid volvulus. 3.  Oropharyngeal cancer, status post chemoradiation. 4.  Anxiety/depression. 5.  Hypertension. PLAN:  1. Intractable abdominal pain.   We will admit the patient for further evaluation and treatment. We will carry out pain control as well as other supportive treatment including fluid therapy as well as antiemetic. The intractable abdominal pain is most likely as a result of the sigmoid volvulus, which will be discussed below. 2.  Sigmoid volvulus. The patient's gastroenterologist will be consulted to assist in evaluation and treatment. The patient came back to the hospital because his insurance was not acceptable as an outpatient, so that the patient can have a colonoscopy done as an inpatient. We will await further recommendation from the gastroenterologist.  3.  Oropharyngeal cancer, status post chemoradiation. The patient is in remission according to his spouse. The patient has difficulty with speech following treatment. We will continue supportive treatment. 4.  Anxiety/depression. We will continue with preadmission medication. 5.  Hypertension. We will resume home medication and monitor the patient's blood pressure closely. OTHER ISSUES:  Code status: The patient is a full code. We will request SCD for DVT prophylaxis. FUNCTIONAL STATUS PRIOR TO ADMISSION:  The patient came from home. The patient is ambulatory with a cane. COVID PRECAUTION:  The patient was wearing a face mask. I was wearing a face mask and gloves for this patient's encounter.         Olive Moralez MD      [id number]  D:  01/23/2021 4:24  T:  01/23/2021 5:44  JOB #:  [id number]  CC:  Angelina Sykes MD

## 2021-01-23 NOTE — ROUTINE PROCESS
TRANSFER - OUT REPORT: 
 
Verbal report given to Brandi Mckinney (name) on Corinne Adams  being transferred to [facility] room [id number] (unit) for routine progression of care Report consisted of patients Situation, Background, Assessment and  
Recommendations(SBAR). Information from the following report(s) SBAR, ED Summary and MAR was reviewed with the receiving nurse. Lines:  
Peripheral IV 01/22/21 Right Antecubital (Active) Site Assessment Clean, dry, & intact 01/22/21 1420 Phlebitis Assessment 0 01/22/21 1420 Infiltration Assessment 0 01/22/21 1420 Dressing Status Clean, dry, & intact 01/22/21 1420 Hub Color/Line Status Blue 01/22/21 1420 Action Taken Blood drawn 01/22/21 1420 Opportunity for questions and clarification was provided. Patient transported with: 
 Babybe

## 2021-01-23 NOTE — PROGRESS NOTES
Orthopedics Spine Incoming Transfer Nursing Note        TRANSFER - IN REPORT:    Verbal and/or Written report received from [facility], RN by Chris Simms RN on Monica Mcdowell a 77 y.o. male who was admitted on 1/22/2021  1:55 PM, with an admitting diagnosis of Abdominal pain [R10.9] and being transferred for routine progression of care. Surgery: * No surgery found *      Lines:   Peripheral IV 01/22/21 Right Antecubital (Active)   Site Assessment Clean, dry, & intact 01/22/21 1420   Phlebitis Assessment 0 01/22/21 1420   Infiltration Assessment 0 01/22/21 1420   Dressing Status Clean, dry, & intact 01/22/21 1420   Hub Color/Line Status Blue 01/22/21 1420   Action Taken Blood drawn 01/22/21 1420       Report consisted of the following information ED Summary and Recent Results and the information was reviewed with the reporting nurse. Opportunity for questions and clarifications were provided. Assessment to be completed when patient arrives on the unit.     Chris Simms RN, BSN, [facility]    1/22/2021 at 9:37 PM

## 2021-01-23 NOTE — PROGRESS NOTES
Orthopedic Spine Nursing Note      Patient Name: Kinjal Rey    : 1955               MRN: [id number]    Admit Diagnosis: Abdominal pain [R10.9]    Surgery: * No surgery found *                 Resp therapist informed of EKG order and RN was informed it will be done by the EKG staff in the A.M.         Lines:   Peripheral IV 21 Right Antecubital (Active)   Site Assessment Clean, dry, & intact 21   Phlebitis Assessment 0 21   Infiltration Assessment 0 21   Dressing Status Clean, dry, & intact 21   Dressing Type Transparent 21   Hub Color/Line Status Blue 21   Action Taken Blood drawn 21 1420       Signed by: Amilcar Tineo RN, BSN, CMSRN                                            2021 at 1:44 AM

## 2021-01-23 NOTE — PROGRESS NOTES
[facility] [facility]  Hospitalist Group                                                                                          Hospitalist Progress Note  Zulema Gleason MD  Answering service: [phone] from in house phone        Date of Service:  2021  NAME:  Tara Sol  :  1955  MRN:  [id number]      Admission Summary: This is a 56-year-old man with a past medical history significant for hypertension; anxiety/depression; oropharyngeal cancer, status post chemoradiation treatment, complicated by difficulty with speech, was in his usual state of health until about a week ago when the patient developed abdominal pain. The abdominal pain is diffuse in location, constant, sharp pain with no known aggravating or relieving factors, associated with nausea. The patient and the spouse described the abdominal pain as severe, 10/10 in severity. No associated fever, rigors, or chills. No diarrhea. He was admitted to this hospital on 2021. The patient was admitted for about 5 days. The abdominal pain was attributed to suspected sigmoid volvulus. Gastroenterology consult was requested. The plan was for the patient to have colonoscopy done. Bowel preparation was started and after 5 days of hospitalization, according to the report, the bowel preparation was not adequate and the patient was discharged home for continuation of bowel preparation at home and for outpatient colonoscopy. The patient's spouse stated that the patient finally finished the bowel prep and called the gastroenterologist for the outpatient colonoscopy, but she was told by the gastroenterologist's office that the patient's insurance is not acceptable.   The patient's spouse stated that she called other gastroenterologist and she was told the same thing that the patient's insurance is not acceptable and she was subsequently advised to come back to the hospital so that she can have the colonoscopy done as an inpatient. Because of that, the patient came to the emergency room at [address].  A repeat CT scan of the abdomen and pelvis shows the same finding, which is suggestive of sigmoid volvulus. The hospitalist service was asked to admit the patient to the hospital.  The patient has oropharyngeal cancer, status post chemoradiation and since then, the patient has not been able to speak. I communicated with the patient through his spouse who was present at the bedside.       Interval history / Subjective:     F/u ABdominal pain  Currently no abd pain, comfortably sleeping, arousable with verbal stimuli     Assessment & Plan:      Intractable abdominal pain  -sec to ?sigmoid volvulus  -Awaiting GI for ?colonoscopy    Oropharyngeal cancer, status post chemoradiation.  -Outpatient follow up    Arachnoiditis  -uses morphine 60mg BID at home for chronic pain  -continue gabapentin 600mg TID  -narcan as needed    Hx PE  -on coumdin due to PE in 2019, was on NOAC, but failed treatment and had progression of PE while on medication. Bridget Murphy was transitioned to heparin, but  Developed HIT.  He then transitioned to argatro  - currently anticoagulation on hold     Anxiety/depression  -Continue home meds    Hypertension  -stable on home meds    NPO    Code status: FULL CODE  DVT prophylaxis: scd  PTA: HOME    Plan: Follow [address] discussed with: Patient/Family  Anticipated Disposition: Home w/Family  Anticipated Discharge: 24 hours to 48 hours     Hospital Problems  Date Reviewed: 1/22/2021          Codes Class Noted POA    * (Principal) Abdominal pain ICD-10-CM: R10.9  ICD-9-CM: 789.00  1/18/2021 Yes                Review of Systems:   A comprehensive review of systems was negative except for that written in the HPI. Vital Signs:    Last 24hrs VS reviewed since prior progress note.  Most recent are:  Visit Vitals  /69   Pulse 68   Temp 97.5 °F (36.4 °C)   Resp 16   SpO2 100%         Intake/Output Summary (Last 24 hours) at 1/23/2021 1009  Last data filed at 1/22/2021 2215  Gross per 24 hour   Intake 68.75 ml   Output 350 ml   Net -281.25 ml        Physical Examination:     I had a face to face encounter with this patient and independently examined them on 1/23/2021 as outlined below:          Constitutional:  No acute distress, cooperative, pleasant    ENT:  Oral mucosa moist, oropharynx benign. Resp:  CTA bilaterally. No wheezing/rhonchi/rales. No accessory muscle use   CV:  Regular rhythm, normal rate, no murmurs, gallops, rubs    GI:  Soft, non distended, non tender. normoactive bowel sounds, no hepatosplenomegaly     Musculoskeletal:  No edema, warm, 2+ pulses throughout    Neurologic:  Moves all extremities. AAOx3, CN II-XII reviewed     Skin:  Good turgor, no rashes or ulcers       Data Review:    Review and/or order of clinical lab test      Labs:     Recent Labs     01/23/21  0119 01/22/21  1418   WBC 8.6 8.8   HGB 13.9 15.7   HCT 41.4 46.4    200     Recent Labs     01/23/21  0119 01/22/21  1418 01/21/21  0505    141  --    K 3.8 3.8  --     107  --    CO2 29 28  --    BUN 8 9  --    CREA 0.92 0.95  --    GLU 85 120*  --    CA 8.7 9.2  --    MG 2.4  --  3.0*   PHOS 2.4*  --   --      Recent Labs     01/23/21  0119 01/22/21  1418   ALT 16 18    132*   TBILI 0.6 0.6   TP 7.4 8.6*   ALB 3.6 4.2   GLOB 3.8 4.4*   LPSE 33* 48*     No results for input(s): INR, PTP, APTT, INREXT in the last 72 hours. No results for input(s): FE, TIBC, PSAT, FERR in the last 72 hours. No results found for: FOL, RBCF   No results for input(s): PH, PCO2, PO2 in the last 72 hours. No results for input(s): CPK, CKNDX, TROIQ in the last 72 hours.     No lab exists for component: CPKMB  No results found for: CHOL, CHOLX, CHLST, CHOLV, HDL, HDLP, LDL, LDLC, DLDLP, TGLX, TRIGL, TRIGP, CHHD, CHHDX  No results found for: GLUCPOC  No results found for: COLOR, APPRN, SPGRU, REFSG, GUSTAVO, PROTU, GLUCU, KETU, BILU, UROU, VIRY, LEUKU, GLUKE, EPSU, BACTU, WBCU, RBCU, CASTS, UCRY      Medications Reviewed:     Current Facility-Administered Medications   Medication Dose Route Frequency    clonazePAM (KlonoPIN) tablet 1 mg  1 mg Oral BID    escitalopram oxalate (LEXAPRO) tablet 10 mg  10 mg Oral DAILY    gabapentin (NEURONTIN) tablet 600 mg  600 mg Oral TID    HYDROcodone-acetaminophen (NORCO) 5-325 mg per tablet 1 Tab  1 Tab Oral Q4H PRN    . PHARMACY TO SUBSTITUTE PER PROTOCOL (Reordered from: methylnaltrexone (Relistor) 150 mg tab tablet)    Per Protocol    triamterene-hydroCHLOROthiazide (MAXZIDE) 37.5-25 mg per tablet 1 Tab  1 Tab Oral DAILY    sodium chloride (NS) flush 5-40 mL  5-40 mL IntraVENous Q8H    sodium chloride (NS) flush 5-40 mL  5-40 mL IntraVENous PRN    acetaminophen (TYLENOL) tablet 650 mg  650 mg Oral Q6H PRN    Or    acetaminophen (TYLENOL) suppository 650 mg  650 mg Rectal Q6H PRN    polyethylene glycol (MIRALAX) packet 17 g  17 g Oral DAILY PRN    promethazine (PHENERGAN) tablet 12.5 mg  12.5 mg Oral Q6H PRN    Or    ondansetron (ZOFRAN) injection 4 mg  4 mg IntraVENous Q6H PRN    lactated Ringers infusion  75 mL/hr IntraVENous CONTINUOUS    morphine injection 2 mg  2 mg IntraVENous Q4H PRN     ______________________________________________________________________  EXPECTED LENGTH OF STAY: - - -  ACTUAL LENGTH OF STAY:          1                 Francine Cox MD

## 2021-01-23 NOTE — PROGRESS NOTES
Problem: Falls - Risk of  Goal: *Absence of Falls  Description: Document [place] Reason Fall Risk and appropriate interventions in the flowsheet.   Outcome: Progressing Towards Goal  Note: Fall Risk Interventions:  Mobility Interventions: Utilize walker, cane, or other assistive device

## 2021-01-23 NOTE — CONSULTS
[address]  [address] [facility], [facility] [address]  [phone]        GI CONSULTATION NOTE      NAME:  Monica Mcdowell   :   1955   MRN:   [id number]     Referring Physician: Angelene Lennox, MD     Consult Date: 2021     Chief Complaint:   Chief Complaint   Patient presents with    Abdominal Pain       Reason for Consult: Abdominal pain and distension with concern for sigmoid volvulus    History of Present Illness:  Patient is a 77 y.o. with past medical history significant for chronic back pain on long term narcotics who is seen for above reason. Patient is well known to our service. He was recently in the hospital for the same issue. We tried for almost a week to get his bowels moving with multiple rounds of bowel prep but were not able to. There was concern for volvulus on CT but this was clarified that there is no bowel obstruction and it was mainly a very redundant sigmoid colon. Patient was supposed to go home and follow up as outpatient but apparently there were some issues getting in to see any GIs in town. I am not sure what happened there. For now patient continues to complain of abdominal pain and apparently wife has been demanding more and more pain medications for this. At the time of my visit patient appeared quite comfortable but slightly drowsy. No issues comprehending patient but he seems to at times have difficulty following me. PMH:  Past Medical History:   Diagnosis Date    Asthma 2017    Hypertension     Other ill-defined conditions(725.07)     kidney stones       PSH:  Past Surgical History:   Procedure Laterality Date    HX APPENDECTOMY      HX BACK SURGERY      L5 - S1    HX CHOLECYSTECTOMY         Allergies: Allergies   Allergen Reactions    Heparin Other (comments)     Heparin induced thrombocytopenia       Home Medications:  Prior to Admission Medications   Prescriptions Last Dose Informant Patient Reported? Taking? HYDROcodone-acetaminophen (NORCO) 5-325 mg per tablet 1/15/2021 at Unknown time  No Yes   Si-2 tabs PO Q 4-6 hrs PRN   Morphine (RENY) 60 mg ER capsule 1/15/2021 at Unknown time Significant Other Yes Yes   Sig: Take 60 mg by mouth three (3) times daily. clonazePAM (KLONOPIN) 1 mg tablet 2021 at Unknown time Significant Other Yes Yes   Sig: Take 1 mg by mouth two (2) times a day. docusate sodium (Dulcolax Stool Softener, dss,) 100 mg capsule 2020 at Unknown time  No Yes   Sig: Take 1 Cap by mouth two (2) times a day for 90 days. escitalopram (LEXAPRO) 10 mg tablet 2021 at Unknown time Significant Other Yes Yes   Sig: Take 10 mg by mouth daily. furosemide (LASIX) 20 mg tablet 2021 at Unknown time Significant Other Yes Yes   Sig: Take 20 mg by mouth two (2) times a day.   gabapentin (NEURONTIN) 600 mg tablet 2021 at Unknown time Significant Other Yes Yes   Sig: Take 600 mg by mouth three (3) times daily. methylnaltrexone (Relistor) 150 mg tab tablet 1/15/2021 at Unknown time  No Yes   Sig: Take 3 Tabs by mouth Daily (before breakfast). polyethylene glycol (Miralax) 17 gram packet 1/15/2021 at Unknown time  No Yes   Sig: Take 1 Packet by mouth daily. 2-4 packets daily   potassium chloride (KLOR-CON M20) 20 mEq tablet 1/15/2021 at Unknown time Significant Other Yes Yes   Sig: Take 20 mEq by mouth as needed for Other (when taking fluid). predniSONE (DELTASONE) 20 mg tablet 1/15/2021 at Unknown time  No Yes   Si qd for 4 d. 1 1/2 qd for 4 d. 1 qd for 4 d, 1/2 qd for 4 d.   senna (Senna) 8.6 mg tablet 1/15/2021 at Unknown time  No Yes   Sig: Take 2 Tabs by mouth daily. triamterene-hydrochlorothiazide (MAXZIDE-25MG) 37.5-25 mg per tablet 1/15/2021 at Unknown time Significant Other Yes Yes   Sig: Take 1 Tab by mouth daily. zolpidem (AMBIEN) 10 mg tablet 1/15/2021 at Unknown time Significant Other Yes Yes   Sig: Take 10 mg by mouth nightly as needed for Sleep. Facility-Administered Medications: None       Hospital Medications:  Current Facility-Administered Medications   Medication Dose Route Frequency    clonazePAM (KlonoPIN) tablet 1 mg  1 mg Oral BID    escitalopram oxalate (LEXAPRO) tablet 10 mg  10 mg Oral DAILY    gabapentin (NEURONTIN) tablet 600 mg  600 mg Oral TID    HYDROcodone-acetaminophen (NORCO) 5-325 mg per tablet 1 Tab  1 Tab Oral Q4H PRN    . PHARMACY TO SUBSTITUTE PER PROTOCOL (Reordered from: methylnaltrexone (Relistor) 150 mg tab tablet)    Per Protocol    triamterene-hydroCHLOROthiazide (MAXZIDE) 37.5-25 mg per tablet 1 Tab  1 Tab Oral DAILY    sodium chloride (NS) flush 5-40 mL  5-40 mL IntraVENous Q8H    sodium chloride (NS) flush 5-40 mL  5-40 mL IntraVENous PRN    acetaminophen (TYLENOL) tablet 650 mg  650 mg Oral Q6H PRN    Or    acetaminophen (TYLENOL) suppository 650 mg  650 mg Rectal Q6H PRN    polyethylene glycol (MIRALAX) packet 17 g  17 g Oral DAILY PRN    promethazine (PHENERGAN) tablet 12.5 mg  12.5 mg Oral Q6H PRN    Or    ondansetron (ZOFRAN) injection 4 mg  4 mg IntraVENous Q6H PRN    lactated Ringers infusion  75 mL/hr IntraVENous CONTINUOUS    morphine injection 2 mg  2 mg IntraVENous Q4H PRN       Social History:  Social History     Tobacco Use    Smoking status: Never Smoker    Smokeless tobacco: Never Used   Substance Use Topics    Alcohol use: No       Family History:  History reviewed. No pertinent family history. Review of Systems:  A detailed 10 system ROS is obtained, with pertinent positives as listed in the HPI and PMH. All others are negative. Objective:     Patient Vitals for the past 8 hrs:   BP Temp Pulse Resp SpO2   01/23/21 1517 124/68 97.4 °F (36.3 °C) 73 16 96 %     No intake/output data recorded. 01/21 1901 - 01/23 0700  In: 68.8 [I.V.:68.8]  Out: 350 [Urine:350]    PHYSICAL EXAM:    General: WD, WN. cooperative, no acute distress .  Slightly drowsy and at times having difficulty following me.   HEENT: NC, Atraumatic. PERRLA, EOMI. Anicteric sclerae. Abdomen: Soft, non distended, Non tender. +Bowel sounds, no HSM  Extremities: No c/c/e  Neurologic:  CN 2-12 gi, Slightly drowsy but oriented X 3. No acute neurological distress   Psych:   Good insight. Not anxious nor agitated. Data Review     Recent Labs     01/23/21  0119 01/22/21  1418   WBC 8.6 8.8   HGB 13.9 15.7   HCT 41.4 46.4    200     Recent Labs     01/23/21  0119 01/22/21  1418    141   K 3.8 3.8    107   CO2 29 28   BUN 8 9   CREA 0.92 0.95   GLU 85 120*   PHOS 2.4*  --    CA 8.7 9.2     Recent Labs     01/23/21  0119 01/22/21  1418    132*   TP 7.4 8.6*   ALB 3.6 4.2   GLOB 3.8 4.4*   LPSE 33* 48*     Imaging studies: Reviewed    01/23/2021: CT abd/pelvis with contrast:    1. There are air-fluid levels in the large bowel suggesting diarrhea. 2. Gaseous distention of large bowel with redundant sigmoid colon extending into  the right upper quadrant. 3. Right-sided nephrolithiasis. 4. Incidental findings as above. Assessment:   1. Abdominal distension and pain with obstipation: Worry that narcotics are only making this worse. Had extensive discussion with patient and wife. Patient appears to understand but wife was very adamant about him getting more narcotics. She kept threatening about signing him out AMA or having another GI opinion. I offered to have a different gastroenterologist on Monday but she felt this would be too late and \"he might not make it\". I advised that for tonight I will allow hospitalist to make decision on pain control. From my standpoint I will have nurses place rectal tube and start gentle enemas with plans for flex sig/colon in the morning to place longer decompression tube. Will also start him on SQ methylnaltrexone. 2. Redundant colon: No clear signs of volvulus. 3. Chronic back pain: On chronic high dose long term narcotics           Plan:   1. Place rectal tube  2.  Start gentle tap water enemas every 4-6 hours overnight  3. Clear liquids only and NPO after midnight  4. Plan for flexible sigmoidoscopy/colonoscopy in am to decompress and place decompression tube  5. Start SQ methylnaltrexone 12 mg daily  6. Work on scaling back narcotics and try antispasmodics for pain control if needed  7. Will have [facility] take over care starting Monday for second opinion. Thank you for allowing us to participate in the care of this patient. Please do not hesitate to contact us with any further questions/concerns.     Haroon De Oliveira MD  Gastroenterology;l

## 2021-01-24 ENCOUNTER — ANESTHESIA (OUTPATIENT)
Dept: SURGERY | Age: 66
DRG: 392 | End: 2021-01-24
Payer: MEDICARE

## 2021-01-24 ENCOUNTER — ANESTHESIA EVENT (OUTPATIENT)
Dept: SURGERY | Age: 66
DRG: 392 | End: 2021-01-24
Payer: MEDICARE

## 2021-01-24 PROCEDURE — 0D7M8DZ DILATION OF DESCENDING COLON WITH INTRALUMINAL DEVICE, VIA NATURAL OR ARTIFICIAL OPENING ENDOSCOPIC: ICD-10-PCS | Performed by: INTERNAL MEDICINE

## 2021-01-24 PROCEDURE — 74011250636 HC RX REV CODE- 250/636: Performed by: INTERNAL MEDICINE

## 2021-01-24 PROCEDURE — 74011250637 HC RX REV CODE- 250/637: Performed by: INTERNAL MEDICINE

## 2021-01-24 PROCEDURE — 76010000149 HC OR TIME 1 TO 1.5 HR: Performed by: INTERNAL MEDICINE

## 2021-01-24 PROCEDURE — 77030010545: Performed by: INTERNAL MEDICINE

## 2021-01-24 PROCEDURE — 77030013992 HC SNR POLYP ENDOSC BSC -B: Performed by: INTERNAL MEDICINE

## 2021-01-24 PROCEDURE — 2709999900 HC NON-CHARGEABLE SUPPLY: Performed by: INTERNAL MEDICINE

## 2021-01-24 PROCEDURE — 74011250636 HC RX REV CODE- 250/636: Performed by: NURSE ANESTHETIST, CERTIFIED REGISTERED

## 2021-01-24 PROCEDURE — 76210000016 HC OR PH I REC 1 TO 1.5 HR: Performed by: INTERNAL MEDICINE

## 2021-01-24 PROCEDURE — 74011000250 HC RX REV CODE- 250: Performed by: NURSE ANESTHETIST, CERTIFIED REGISTERED

## 2021-01-24 PROCEDURE — 65270000029 HC RM PRIVATE

## 2021-01-24 PROCEDURE — 76060000033 HC ANESTHESIA 1 TO 1.5 HR: Performed by: INTERNAL MEDICINE

## 2021-01-24 RX ORDER — MIDAZOLAM HYDROCHLORIDE 1 MG/ML
INJECTION, SOLUTION INTRAMUSCULAR; INTRAVENOUS AS NEEDED
Status: DISCONTINUED | OUTPATIENT
Start: 2021-01-24 | End: 2021-01-24 | Stop reason: HOSPADM

## 2021-01-24 RX ORDER — FENTANYL CITRATE 50 UG/ML
50 INJECTION, SOLUTION INTRAMUSCULAR; INTRAVENOUS AS NEEDED
Status: DISCONTINUED | OUTPATIENT
Start: 2021-01-24 | End: 2021-01-24 | Stop reason: HOSPADM

## 2021-01-24 RX ORDER — SODIUM CHLORIDE 9 MG/ML
25 INJECTION, SOLUTION INTRAVENOUS CONTINUOUS
Status: DISCONTINUED | OUTPATIENT
Start: 2021-01-24 | End: 2021-01-24 | Stop reason: HOSPADM

## 2021-01-24 RX ORDER — ALBUTEROL SULFATE 0.83 MG/ML
2.5 SOLUTION RESPIRATORY (INHALATION) AS NEEDED
Status: DISCONTINUED | OUTPATIENT
Start: 2021-01-24 | End: 2021-01-24 | Stop reason: HOSPADM

## 2021-01-24 RX ORDER — PROPOFOL 10 MG/ML
INJECTION, EMULSION INTRAVENOUS AS NEEDED
Status: DISCONTINUED | OUTPATIENT
Start: 2021-01-24 | End: 2021-01-24 | Stop reason: HOSPADM

## 2021-01-24 RX ORDER — SODIUM CHLORIDE 0.9 % (FLUSH) 0.9 %
5-40 SYRINGE (ML) INJECTION EVERY 8 HOURS
Status: DISCONTINUED | OUTPATIENT
Start: 2021-01-24 | End: 2021-01-27 | Stop reason: HOSPADM

## 2021-01-24 RX ORDER — PHENYLEPHRINE HCL IN 0.9% NACL 0.4MG/10ML
SYRINGE (ML) INTRAVENOUS AS NEEDED
Status: DISCONTINUED | OUTPATIENT
Start: 2021-01-24 | End: 2021-01-24 | Stop reason: HOSPADM

## 2021-01-24 RX ORDER — ROPIVACAINE HYDROCHLORIDE 5 MG/ML
30 INJECTION, SOLUTION EPIDURAL; INFILTRATION; PERINEURAL AS NEEDED
Status: DISCONTINUED | OUTPATIENT
Start: 2021-01-24 | End: 2021-01-24 | Stop reason: HOSPADM

## 2021-01-24 RX ORDER — HYDROCODONE BITARTRATE AND ACETAMINOPHEN 5; 325 MG/1; MG/1
1 TABLET ORAL AS NEEDED
Status: DISCONTINUED | OUTPATIENT
Start: 2021-01-24 | End: 2021-01-24 | Stop reason: HOSPADM

## 2021-01-24 RX ORDER — MIDAZOLAM HYDROCHLORIDE 1 MG/ML
.25-5 INJECTION, SOLUTION INTRAMUSCULAR; INTRAVENOUS
Status: DISPENSED | OUTPATIENT
Start: 2021-01-24 | End: 2021-01-24

## 2021-01-24 RX ORDER — LIDOCAINE HYDROCHLORIDE 20 MG/ML
INJECTION, SOLUTION EPIDURAL; INFILTRATION; INTRACAUDAL; PERINEURAL AS NEEDED
Status: DISCONTINUED | OUTPATIENT
Start: 2021-01-24 | End: 2021-01-24 | Stop reason: HOSPADM

## 2021-01-24 RX ORDER — SODIUM CHLORIDE 9 MG/ML
75 INJECTION, SOLUTION INTRAVENOUS CONTINUOUS
Status: DISPENSED | OUTPATIENT
Start: 2021-01-24 | End: 2021-01-24

## 2021-01-24 RX ORDER — SODIUM CHLORIDE 0.9 % (FLUSH) 0.9 %
5-40 SYRINGE (ML) INJECTION AS NEEDED
Status: DISCONTINUED | OUTPATIENT
Start: 2021-01-24 | End: 2021-01-27 | Stop reason: HOSPADM

## 2021-01-24 RX ORDER — GLYCOPYRROLATE 0.2 MG/ML
0.2 INJECTION INTRAMUSCULAR; INTRAVENOUS
Status: DISCONTINUED | OUTPATIENT
Start: 2021-01-24 | End: 2021-01-24 | Stop reason: HOSPADM

## 2021-01-24 RX ORDER — NALOXONE HYDROCHLORIDE 0.4 MG/ML
0.4 INJECTION, SOLUTION INTRAMUSCULAR; INTRAVENOUS; SUBCUTANEOUS
Status: ACTIVE | OUTPATIENT
Start: 2021-01-24 | End: 2021-01-24

## 2021-01-24 RX ORDER — HYDROMORPHONE HYDROCHLORIDE 1 MG/ML
0.2 INJECTION, SOLUTION INTRAMUSCULAR; INTRAVENOUS; SUBCUTANEOUS
Status: DISCONTINUED | OUTPATIENT
Start: 2021-01-24 | End: 2021-01-24 | Stop reason: HOSPADM

## 2021-01-24 RX ORDER — ATROPINE SULFATE 0.1 MG/ML
0.5 INJECTION INTRAVENOUS
Status: ACTIVE | OUTPATIENT
Start: 2021-01-24 | End: 2021-01-25

## 2021-01-24 RX ORDER — ACETAMINOPHEN 325 MG/1
650 TABLET ORAL ONCE
Status: DISCONTINUED | OUTPATIENT
Start: 2021-01-24 | End: 2021-01-24 | Stop reason: HOSPADM

## 2021-01-24 RX ORDER — MIDAZOLAM HYDROCHLORIDE 1 MG/ML
1 INJECTION, SOLUTION INTRAMUSCULAR; INTRAVENOUS AS NEEDED
Status: DISCONTINUED | OUTPATIENT
Start: 2021-01-24 | End: 2021-01-24 | Stop reason: HOSPADM

## 2021-01-24 RX ORDER — DEXTROMETHORPHAN/PSEUDOEPHED 2.5-7.5/.8
1.2 DROPS ORAL
Status: DISCONTINUED | OUTPATIENT
Start: 2021-01-24 | End: 2021-01-27 | Stop reason: HOSPADM

## 2021-01-24 RX ORDER — ONDANSETRON 2 MG/ML
4 INJECTION INTRAMUSCULAR; INTRAVENOUS AS NEEDED
Status: DISCONTINUED | OUTPATIENT
Start: 2021-01-24 | End: 2021-01-24 | Stop reason: HOSPADM

## 2021-01-24 RX ORDER — SODIUM CHLORIDE, SODIUM LACTATE, POTASSIUM CHLORIDE, CALCIUM CHLORIDE 600; 310; 30; 20 MG/100ML; MG/100ML; MG/100ML; MG/100ML
1000 INJECTION, SOLUTION INTRAVENOUS CONTINUOUS
Status: DISCONTINUED | OUTPATIENT
Start: 2021-01-24 | End: 2021-01-24 | Stop reason: HOSPADM

## 2021-01-24 RX ORDER — DIPHENHYDRAMINE HYDROCHLORIDE 50 MG/ML
12.5 INJECTION, SOLUTION INTRAMUSCULAR; INTRAVENOUS AS NEEDED
Status: DISCONTINUED | OUTPATIENT
Start: 2021-01-24 | End: 2021-01-24 | Stop reason: HOSPADM

## 2021-01-24 RX ORDER — FENTANYL CITRATE 50 UG/ML
12.5-2 INJECTION, SOLUTION INTRAMUSCULAR; INTRAVENOUS
Status: ACTIVE | OUTPATIENT
Start: 2021-01-24 | End: 2021-01-24

## 2021-01-24 RX ORDER — SODIUM CHLORIDE, SODIUM LACTATE, POTASSIUM CHLORIDE, CALCIUM CHLORIDE 600; 310; 30; 20 MG/100ML; MG/100ML; MG/100ML; MG/100ML
INJECTION, SOLUTION INTRAVENOUS
Status: DISCONTINUED | OUTPATIENT
Start: 2021-01-24 | End: 2021-01-24 | Stop reason: HOSPADM

## 2021-01-24 RX ORDER — FLUMAZENIL 0.1 MG/ML
0.2 INJECTION INTRAVENOUS
Status: ACTIVE | OUTPATIENT
Start: 2021-01-24 | End: 2021-01-24

## 2021-01-24 RX ORDER — FENTANYL CITRATE 50 UG/ML
25 INJECTION, SOLUTION INTRAMUSCULAR; INTRAVENOUS
Status: DISCONTINUED | OUTPATIENT
Start: 2021-01-24 | End: 2021-01-24 | Stop reason: HOSPADM

## 2021-01-24 RX ORDER — EPINEPHRINE 0.1 MG/ML
1 INJECTION INTRACARDIAC; INTRAVENOUS
Status: DISPENSED | OUTPATIENT
Start: 2021-01-24 | End: 2021-01-25

## 2021-01-24 RX ORDER — MIDAZOLAM HYDROCHLORIDE 1 MG/ML
0.5 INJECTION, SOLUTION INTRAMUSCULAR; INTRAVENOUS
Status: DISCONTINUED | OUTPATIENT
Start: 2021-01-24 | End: 2021-01-24 | Stop reason: HOSPADM

## 2021-01-24 RX ORDER — LIDOCAINE HYDROCHLORIDE 10 MG/ML
0.1 INJECTION, SOLUTION EPIDURAL; INFILTRATION; INTRACAUDAL; PERINEURAL AS NEEDED
Status: DISCONTINUED | OUTPATIENT
Start: 2021-01-24 | End: 2021-01-24 | Stop reason: HOSPADM

## 2021-01-24 RX ORDER — MORPHINE SULFATE 10 MG/ML
2 INJECTION, SOLUTION INTRAMUSCULAR; INTRAVENOUS
Status: DISCONTINUED | OUTPATIENT
Start: 2021-01-24 | End: 2021-01-24 | Stop reason: HOSPADM

## 2021-01-24 RX ADMIN — PROPOFOL 25 MG: 10 INJECTION, EMULSION INTRAVENOUS at 15:33

## 2021-01-24 RX ADMIN — PROPOFOL 25 MG: 10 INJECTION, EMULSION INTRAVENOUS at 15:38

## 2021-01-24 RX ADMIN — LIDOCAINE HYDROCHLORIDE 100 MG: 20 INJECTION, SOLUTION EPIDURAL; INFILTRATION; INTRACAUDAL; PERINEURAL at 15:07

## 2021-01-24 RX ADMIN — CLONAZEPAM 1 MG: 1 TABLET ORAL at 17:53

## 2021-01-24 RX ADMIN — PROPOFOL 25 MG: 10 INJECTION, EMULSION INTRAVENOUS at 15:35

## 2021-01-24 RX ADMIN — PROPOFOL 25 MG: 10 INJECTION, EMULSION INTRAVENOUS at 15:12

## 2021-01-24 RX ADMIN — PROPOFOL 25 MG: 10 INJECTION, EMULSION INTRAVENOUS at 15:20

## 2021-01-24 RX ADMIN — Medication 80 MCG: at 15:42

## 2021-01-24 RX ADMIN — PROPOFOL 25 MG: 10 INJECTION, EMULSION INTRAVENOUS at 15:52

## 2021-01-24 RX ADMIN — MORPHINE SULFATE 2 MG: 2 INJECTION, SOLUTION INTRAMUSCULAR; INTRAVENOUS at 02:32

## 2021-01-24 RX ADMIN — MORPHINE SULFATE 2 MG: 2 INJECTION, SOLUTION INTRAMUSCULAR; INTRAVENOUS at 13:41

## 2021-01-24 RX ADMIN — CLONAZEPAM 1 MG: 1 TABLET ORAL at 08:54

## 2021-01-24 RX ADMIN — PROPOFOL 25 MG: 10 INJECTION, EMULSION INTRAVENOUS at 15:28

## 2021-01-24 RX ADMIN — PROPOFOL 25 MG: 10 INJECTION, EMULSION INTRAVENOUS at 15:43

## 2021-01-24 RX ADMIN — PROPOFOL 25 MG: 10 INJECTION, EMULSION INTRAVENOUS at 15:14

## 2021-01-24 RX ADMIN — MORPHINE SULFATE 2 MG: 2 INJECTION, SOLUTION INTRAMUSCULAR; INTRAVENOUS at 06:13

## 2021-01-24 RX ADMIN — ESCITALOPRAM OXALATE 10 MG: 10 TABLET ORAL at 08:54

## 2021-01-24 RX ADMIN — MIDAZOLAM 2 MG: 1 INJECTION INTRAMUSCULAR; INTRAVENOUS at 15:05

## 2021-01-24 RX ADMIN — PROPOFOL 25 MG: 10 INJECTION, EMULSION INTRAVENOUS at 15:23

## 2021-01-24 RX ADMIN — GABAPENTIN 600 MG: 600 TABLET, FILM COATED ORAL at 21:16

## 2021-01-24 RX ADMIN — PROPOFOL 75 MG: 10 INJECTION, EMULSION INTRAVENOUS at 15:07

## 2021-01-24 RX ADMIN — Medication 10 ML: at 06:00

## 2021-01-24 RX ADMIN — PROPOFOL 25 MG: 10 INJECTION, EMULSION INTRAVENOUS at 15:18

## 2021-01-24 RX ADMIN — SODIUM CHLORIDE, POTASSIUM CHLORIDE, SODIUM LACTATE AND CALCIUM CHLORIDE: 600; 310; 30; 20 INJECTION, SOLUTION INTRAVENOUS at 14:51

## 2021-01-24 RX ADMIN — TRIAMTERENE AND HYDROCHLOROTHIAZIDE 1 TABLET: 37.5; 25 TABLET ORAL at 08:54

## 2021-01-24 RX ADMIN — HYDROCODONE BITARTRATE AND ACETAMINOPHEN 1 TABLET: 5; 325 TABLET ORAL at 04:38

## 2021-01-24 RX ADMIN — PROPOFOL 25 MG: 10 INJECTION, EMULSION INTRAVENOUS at 15:47

## 2021-01-24 RX ADMIN — GABAPENTIN 600 MG: 600 TABLET, FILM COATED ORAL at 17:53

## 2021-01-24 RX ADMIN — Medication 80 MCG: at 15:21

## 2021-01-24 RX ADMIN — PROPOFOL 25 MG: 10 INJECTION, EMULSION INTRAVENOUS at 15:16

## 2021-01-24 RX ADMIN — PROPOFOL 25 MG: 10 INJECTION, EMULSION INTRAVENOUS at 15:41

## 2021-01-24 RX ADMIN — GABAPENTIN 600 MG: 600 TABLET, FILM COATED ORAL at 08:54

## 2021-01-24 RX ADMIN — PROPOFOL 25 MG: 10 INJECTION, EMULSION INTRAVENOUS at 15:10

## 2021-01-24 NOTE — PROCEDURES
[address], [address] [place] [address]  [phone]               Colonoscopy Operative Report      Indications:  Colonic distension - for decompression    :  Sandra Parr MD    Staff: Circ-1: Derick Caballero RN  Circ-2: Savana Russell RN  Endoscopy Technician-1: [address] [facility], [facility] 4821 Staff: Azucena Guo RN     Referring Provider: Brain Villaseñor MD    Sedation:  MAC anesthesia    Procedure Details:  After informed consent was obtained with all risks and benefits of procedure explained and preoperative exam completed, the patient was taken to the endoscopy suite and placed in the left lateral decubitus position. Upon sequential sedation as per above, a digital rectal exam was performed  And was normal.  The Olympus videocolonoscope  was inserted in the rectum and carefully advanced to the hepatic flexure. Unable to pass beyond this despite multiple attempts with abdominal pressure, prone position due to significantly redundant colon and looping. The quality of preparation was fair. The colonoscope was slowly withdrawn with careful evaluation between folds. Retroflexion in the rectum was NOT performed. Findings:   Very distended and redundant colon with difficulty passing scope beyond hepatic flexure. Scattered adherent stool seen in the transverse colon affecting visualization. No obvious obstruction or volvulus noted. Ascending Colon: not intubated  Cecum: not intubated  Terminal Ileum: not intubated    Interventions:  A decompression rectal tube was placed up to the descending colon and secured to the patients back with tape. Specimen Removed: * No specimens in log *    Complications: None. EBL:  None. 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  Reached hepatic flexure.   -No obvious obstruction, mass, polyps or volvulus. Recommendations:   -Start clear liquid diet and advance diet to regular diet in 48 hours if no recurrence of abdominal distension.   -Minimize narcotic use as able  -Continue SQ methylnaltrexone  -Start senna 2 tabs twice daily  -Start miralax 2 capfuls with 1 tablespoon benefiber in 16 oz glass of water twice daily  -Keep rectal tube to gravity and connect to intermittent suction if any worsening in abdominal distension. Can consider removing in next 24-48 hours. -If symptoms recur would consult surgery for possible colectomy or venting cecostomy. Discharge Disposition:  Home in the company of a  when able to ambulate.     Basil Edgar MD  1/24/2021  4:15 PM

## 2021-01-24 NOTE — PERIOP NOTES
TRANSFER - OUT REPORT:    Verbal report given to Brooke Tyler on [facility]  being transferred to [phone] for routine post - op       Report consisted of patients Situation, Background, Assessment and   Recommendations(SBAR). Time Pre op antibiotic given:none  Anesthesia Stop time: 4483  Tubbs Present on Transfer to floor:none  Order for Tubbs on Chart:none  Discharge Prescriptions with Chart:none    Information from the following report(s) OR Summary, Procedure Summary, Intake/Output, MAR, Accordion, Recent Results, Med Rec Status and Cardiac Rhythm nsr was reviewed with the receiving nurse. Opportunity for questions and clarification was provided. Is the patient on 02? NO         Is the patient on a monitor? NO    Is the nurse transporting with the patient? YES    Surgical Waiting Area notified of patient's transfer from PACU? NO      The following personal items collected during your admission accompanied patient upon transfer:   Dental Appliance: Dental Appliances: None  Vision:    Hearing Aid:    Jewelry: Jewelry: None  Clothing: Clothing: At bedside  Other Valuables:  Other Valuables: Cell Phone  Valuables sent to safe:

## 2021-01-24 NOTE — PROGRESS NOTES
Bedside and Verbal shift change report given to Dewayne Reeves RN (oncoming nurse) by Natalio Walsh RN (offgoing nurse). Report included the following information SBAR, Kardex, Intake/Output and MAR.

## 2021-01-24 NOTE — PROGRESS NOTES
[address] [facility]  Hospitalist Group                                                                                          Hospitalist Progress Note  Marie Judd MD  Answering service: [phone] from in house phone        Date of Service:  2021  NAME:  Karina Kumari  :  1955  MRN:  [id number]      Admission Summary: This is a 47-year-old man with a past medical history significant for hypertension; anxiety/depression; oropharyngeal cancer, status post chemoradiation treatment, complicated by difficulty with speech, was in his usual state of health until about a week ago when the patient developed abdominal pain. The abdominal pain is diffuse in location, constant, sharp pain with no known aggravating or relieving factors, associated with nausea. The patient and the spouse described the abdominal pain as severe, 10/10 in severity. No associated fever, rigors, or chills. No diarrhea. He was admitted to this hospital on 2021. The patient was admitted for about 5 days. The abdominal pain was attributed to suspected sigmoid volvulus. Gastroenterology consult was requested. The plan was for the patient to have colonoscopy done. Bowel preparation was started and after 5 days of hospitalization, according to the report, the bowel preparation was not adequate and the patient was discharged home for continuation of bowel preparation at home and for outpatient colonoscopy. The patient's spouse stated that the patient finally finished the bowel prep and called the gastroenterologist for the outpatient colonoscopy, but she was told by the gastroenterologist's office that the patient's insurance is not acceptable.   The patient's spouse stated that she called other gastroenterologist and she was told the same thing that the patient's insurance is not acceptable and she was subsequently advised to come back to the hospital so that she can have the colonoscopy done as an inpatient. Because of that, the patient came to the emergency room at [facility].  A repeat CT scan of the abdomen and pelvis shows the same finding, which is suggestive of sigmoid volvulus. The hospitalist service was asked to admit the patient to the hospital.  The patient has oropharyngeal cancer, status post chemoradiation and since then, the patient has not been able to speak. I communicated with the patient through his spouse who was present at the bedside.       Interval history / Subjective:     F/u ABdominal pain  Minimal abd pain  Comfortably laying on bed and waiting for sigmoidoscopy/colonoscopy     Assessment & Plan:      Intractable abdominal pain  -sec to ?sigmoid volvulus  -Appreciate GI for ?colonoscopy/sigmoidoscopy    Oropharyngeal cancer, status post chemoradiation.  -Outpatient follow up    Arachnoiditis  -uses morphine 60mg BID at home for chronic pain  -continue gabapentin 600mg TID  -narcan as needed    Hx PE  -on coumdin due to PE in 2019, was on NOAC, but failed treatment and had progression of PE while on medication. 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  He then transitioned to argatro  - currently anticoagulation on hold     Anxiety/depression  -Continue home meds    Hypertension  -stable on home meds    NPO    Code status: FULL CODE  DVT prophylaxis: scd  PTA: HOME    Plan: Follow GI, likely discharge tomorrow if cleared by GI    Care Plan discussed with: Patient/Family  Anticipated Disposition: Home w/Family  Anticipated Discharge: 24 hours to 48 hours     Hospital Problems  Date Reviewed: 1/22/2021          Codes Class Noted POA    * (Principal) Abdominal pain ICD-10-CM: R10.9  ICD-9-CM: 789.00  1/18/2021 Yes                Review of Systems:   A comprehensive review of systems was negative except for that written in the HPI. Vital Signs:    Last 24hrs VS reviewed since prior progress note.  Most recent are:  Visit Vitals  /62   Pulse 77   Temp 98.1 °F (36.7 °C)   Resp 16   SpO2 95%         Intake/Output Summary (Last 24 hours) at 1/24/2021 1138  Last data filed at 1/24/2021 0859  Gross per 24 hour   Intake 1788.75 ml   Output 1125 ml   Net 663.75 ml        Physical Examination:     I had a face to face encounter with this patient and independently examined them on 1/24/2021 as outlined below:          Constitutional:  No acute distress, cooperative, pleasant    ENT:  Oral mucosa moist, oropharynx benign. Resp:  CTA bilaterally. No wheezing/rhonchi/rales. No accessory muscle use   CV:  Regular rhythm, normal rate, no murmurs, gallops, rubs    GI:  Soft, non distended, non tender. normoactive bowel sounds, no hepatosplenomegaly     Musculoskeletal:  No edema, warm, 2+ pulses throughout    Neurologic:  Moves all extremities. AAOx3, CN II-XII reviewed     Skin:  Good turgor, no rashes or ulcers       Data Review:    Review and/or order of clinical lab test      Labs:     Recent Labs     01/23/21  0119 01/22/21  1418   WBC 8.6 8.8   HGB 13.9 15.7   HCT 41.4 46.4    200     Recent Labs     01/23/21  0119 01/22/21  1418    141   K 3.8 3.8    107   CO2 29 28   BUN 8 9   CREA 0.92 0.95   GLU 85 120*   CA 8.7 9.2   MG 2.4  --    PHOS 2.4*  --      Recent Labs     01/23/21  0119 01/22/21  1418   ALT 16 18    132*   TBILI 0.6 0.6   TP 7.4 8.6*   ALB 3.6 4.2   GLOB 3.8 4.4*   LPSE 33* 48*     No results for input(s): INR, PTP, APTT, INREXT, INREXT in the last 72 hours. No results for input(s): FE, TIBC, PSAT, FERR in the last 72 hours. No results found for: FOL, RBCF   No results for input(s): PH, PCO2, PO2 in the last 72 hours. No results for input(s): CPK, CKNDX, TROIQ in the last 72 hours.     No lab exists for component: CPKMB  No results found for: CHOL, CHOLX, CHLST, CHOLV, HDL, HDLP, LDL, LDLC, DLDLP, TGLX, TRIGL, TRIGP, CHHD, CHHDX  No results found for: GLUCPOC  No results found for: COLOR, APPRN, SPGRU, REFSG, GUSTAVO, PROTU, GLUCU, KETU, BILU, UROU, VIRY, LEUKU, GLUKE, EPSU, BACTU, WBCU, RBCU, CASTS, UCRY      Medications Reviewed:     Current Facility-Administered Medications   Medication Dose Route Frequency    methylnaltrexone (RELISTOR) injection 12 mg  12 mg SubCUTAneous DAILY PRN    clonazePAM (KlonoPIN) tablet 1 mg  1 mg Oral BID    escitalopram oxalate (LEXAPRO) tablet 10 mg  10 mg Oral DAILY    gabapentin (NEURONTIN) tablet 600 mg  600 mg Oral TID    HYDROcodone-acetaminophen (NORCO) 5-325 mg per tablet 1 Tab  1 Tab Oral Q4H PRN    triamterene-hydroCHLOROthiazide (MAXZIDE) 37.5-25 mg per tablet 1 Tab  1 Tab Oral DAILY    sodium chloride (NS) flush 5-40 mL  5-40 mL IntraVENous Q8H    sodium chloride (NS) flush 5-40 mL  5-40 mL IntraVENous PRN    acetaminophen (TYLENOL) tablet 650 mg  650 mg Oral Q6H PRN    Or    acetaminophen (TYLENOL) suppository 650 mg  650 mg Rectal Q6H PRN    polyethylene glycol (MIRALAX) packet 17 g  17 g Oral DAILY PRN    promethazine (PHENERGAN) tablet 12.5 mg  12.5 mg Oral Q6H PRN    Or    ondansetron (ZOFRAN) injection 4 mg  4 mg IntraVENous Q6H PRN    lactated Ringers infusion  75 mL/hr IntraVENous CONTINUOUS    morphine injection 2 mg  2 mg IntraVENous Q4H PRN     ______________________________________________________________________  EXPECTED LENGTH OF STAY: - - -  ACTUAL LENGTH OF STAY:          2                 Brian Roland MD

## 2021-01-24 NOTE — PROGRESS NOTES
Orthopedic Spine Nursing Note      Patient Name: Lieutenant Sethi    : 1955               MRN: [id number]    Admit Diagnosis: Abdominal pain [R10.9]    Surgery: * No surgery found *        Daughter of patient called floor screaming and using profanity over the phone demanding what the plan of care for her father, when this RN told her briefly the plan for her father, she said the this RN broke the law as I should have not given her any information. This RN stated that she is being assisted based on her inquiries. She is requesting that MD call her.         Lines:   Peripheral IV 21 Right Antecubital (Active)   Site Assessment Clean, dry, & intact 21 0825   Phlebitis Assessment 0 21 0825   Infiltration Assessment 0 21 0825   Dressing Status Clean, dry, & intact 21 0825   Dressing Type Transparent 21 0825   Hub Color/Line Status Infusing 21 0825   Action Taken Open ports on tubing capped 21 0825   Alcohol Cap Used Yes 21 0825       Signed by: Monroe Coon RN, BSN, CMSRN                                            2021 at 7:59 PM

## 2021-01-24 NOTE — PROGRESS NOTES
Hospitalist Cross Coverage NP     Name: Samara Lim  YOB: 1955  MRN: [id number]  Admission Date: 1/22/2021  1:55 PM    Date of service: 1/23/2021 7:35 PM                                Overnight update:        Received call requesting evaluation of patient face to face. RN states wife is upset that patient has not been receiving more pain medication, though on chart review it looks like he just received norco at 1900 and finished speaking to GI moments ago. - Continue current analgesia plan of care, GI advised to attempt to wean back on narcotics for pain control  - Will evaluate patient shortly, but currently no indication to increase narcotics    Addendum:  - Evaluated patient, resting in bed reading magazine, in no distress at this time. Eager to have colonoscopy tomorrow. - States his abdominal pain was well managed on 4mg morphine, 2mg morphine ineffective. Explained goal of weaning morphine due to opioid induced constipation, understands but frustrated. - Will give one time dose of 1mg morphine if needed, he is agreeable to this, otherwise follow plan of care as laid out by day team  - Attempted to call wife Yane Rodarte at [phone] - Does not ring, message states unable to receive calls at this time, no voicemail option    0948:  - Contact number corrected, [phone]. Spoke to Yane Rodarte, wife, over phone. Explained plan of care and role of overnight coverage. Explained that I generally do not adjust the primary team's plan, but can address any acute pain/issues that arise overnight with plans for colonoscopy in the AM. She is agreeable to this but requests he be moved to another unit. She has already discussed this with the nursing supervisors and it is in process.      Nicole MITCHELL-C, PA-C  [phone] or Tania

## 2021-01-24 NOTE — PROGRESS NOTES
Problem: Falls - Risk of  Goal: *Absence of Falls  Description: Document Marycruz Gonzalez Fall Risk and appropriate interventions in the flowsheet.   Outcome: Progressing Towards Goal  Note: Fall Risk Interventions:  Mobility Interventions: Utilize walker, cane, or other assistive device

## 2021-01-24 NOTE — ANESTHESIA POSTPROCEDURE EVALUATION
Post-Anesthesia Evaluation and Assessment    Patient: See Coughlin MRN: [id number]  SSN: [id number]    YOB: 1955  Age: 77 y.o. Sex: male      I have evaluated the patient and they are stable and ready for discharge from the PACU. Cardiovascular Function/Vital Signs  Visit Vitals  /71   Pulse 64   Temp 36.4 °C (97.6 °F)   Resp 16   SpO2 100%       Patient is status post General anesthesia for Procedure(s):  COLONOSCOPY. Nausea/Vomiting: None    Postoperative hydration reviewed and adequate. Pain:  Pain Scale 1: Numeric (0 - 10) (01/24/21 1657)  Pain Intensity 1: 0 (01/24/21 1657)   Managed    Neurological Status:   Neuro (WDL): Within Defined Limits (01/24/21 1657)  Neuro  Neurologic State: Alert (01/24/21 1657)  LUE Motor Response: Purposeful (01/24/21 1626)  LLE Motor Response: Purposeful (01/24/21 1626)  RUE Motor Response: Purposeful (01/24/21 1626)  RLE Motor Response: Purposeful (01/24/21 1626)   At baseline    Mental Status, Level of Consciousness: Alert and  oriented to person, place, and time    Pulmonary Status:   O2 Device: Room air (01/24/21 1633)   Adequate oxygenation and airway patent    Complications related to anesthesia: None    Post-anesthesia assessment completed. No concerns    Signed By: Dewayne Espinal MD     January 24, 2021              Procedure(s):  COLONOSCOPY. MAC    <BSHSIANPOST>    INITIAL Post-op Vital signs:   Vitals Value Taken Time   /73 01/24/21 1700   Temp 36.4 °C (97.6 °F) 01/24/21 1657   Pulse 65 01/24/21 1703   Resp 12 01/24/21 1703   SpO2 99 % 01/24/21 1703   Vitals shown include unvalidated device data.

## 2021-01-24 NOTE — PROGRESS NOTES
Orthopedics Spine Outgoing Transfer Nursing Note    10:19 PM    1/23/2021      TRANSFER - OUT REPORT:    Verbal and/or Written report given to Beth eDcker RN by Slade Cowan RN on Roger Master a 77 y.o. male who was admitted on 1/22/2021  1:55 PM and being transferred for routine progression of care. Report consisted of the following information SBAR, Kardex, MAR and Recent Results and the information was reviewed with the receiving nurse. Surgery: * No surgery found *    Lines:   Peripheral IV 01/22/21 Right Antecubital (Active)   Site Assessment Clean, dry, & intact 01/23/21 2030   Phlebitis Assessment 0 01/23/21 2030   Infiltration Assessment 0 01/23/21 2030   Dressing Status Clean, dry, & intact 01/23/21 2030   Dressing Type Transparent 01/23/21 2030   Hub Color/Line Status Infusing 01/23/21 2030   Action Taken Open ports on tubing capped 01/23/21 0825   Alcohol Cap Used Yes 01/23/21 0825       Patient transported with : Registered Nurse    Opportunity for questions and clarifications were provided.         Slade Cowan, RN, BSN, CMSRN

## 2021-01-24 NOTE — ANESTHESIA PREPROCEDURE EVALUATION
Relevant Problems   No relevant active problems       Anesthetic History   No history of anesthetic complications            Review of Systems / Medical History  Patient summary reviewed, nursing notes reviewed and pertinent labs reviewed    Pulmonary            Asthma        Neuro/Psych              Cardiovascular    Hypertension                   GI/Hepatic/Renal                Endo/Other             Other Findings   Comments: Chronic pain on meds           Physical Exam    Airway  Mallampati: II  TM Distance: > 6 cm  Neck ROM: normal range of motion   Mouth opening: Normal     Cardiovascular  Regular rate and rhythm,  S1 and S2 normal,  no murmur, click, rub, or gallop             Dental    Dentition: Edentulous     Pulmonary  Breath sounds clear to auscultation               Abdominal  GI exam deferred       Other Findings            Anesthetic Plan    ASA: 2  Anesthesia type: MAC          Induction: Intravenous  Anesthetic plan and risks discussed with: Patient

## 2021-01-25 PROCEDURE — 74011250636 HC RX REV CODE- 250/636: Performed by: INTERNAL MEDICINE

## 2021-01-25 PROCEDURE — 74011250637 HC RX REV CODE- 250/637: Performed by: INTERNAL MEDICINE

## 2021-01-25 PROCEDURE — 77030018831 HC SOL IRR H20 BAXT -A

## 2021-01-25 PROCEDURE — 74011250637 HC RX REV CODE- 250/637: Performed by: PHYSICIAN ASSISTANT

## 2021-01-25 PROCEDURE — 65270000029 HC RM PRIVATE

## 2021-01-25 RX ORDER — SENNOSIDES 8.6 MG/1
2 TABLET ORAL 2 TIMES DAILY
Status: DISCONTINUED | OUTPATIENT
Start: 2021-01-25 | End: 2021-01-27 | Stop reason: HOSPADM

## 2021-01-25 RX ORDER — POLYETHYLENE GLYCOL 3350 17 G/17G
34 POWDER, FOR SOLUTION ORAL 2 TIMES DAILY
Status: DISCONTINUED | OUTPATIENT
Start: 2021-01-25 | End: 2021-01-27 | Stop reason: HOSPADM

## 2021-01-25 RX ADMIN — MORPHINE SULFATE 2 MG: 2 INJECTION, SOLUTION INTRAMUSCULAR; INTRAVENOUS at 16:18

## 2021-01-25 RX ADMIN — POLYETHYLENE GLYCOL 3350 34 G: 17 POWDER, FOR SOLUTION ORAL at 19:17

## 2021-01-25 RX ADMIN — CLONAZEPAM 1 MG: 1 TABLET ORAL at 08:47

## 2021-01-25 RX ADMIN — TRIAMTERENE AND HYDROCHLOROTHIAZIDE 1 TABLET: 37.5; 25 TABLET ORAL at 08:47

## 2021-01-25 RX ADMIN — SODIUM CHLORIDE, POTASSIUM CHLORIDE, SODIUM LACTATE AND CALCIUM CHLORIDE 75 ML/HR: 600; 310; 30; 20 INJECTION, SOLUTION INTRAVENOUS at 18:32

## 2021-01-25 RX ADMIN — POLYETHYLENE GLYCOL 3350 34 G: 17 POWDER, FOR SOLUTION ORAL at 11:44

## 2021-01-25 RX ADMIN — ESCITALOPRAM OXALATE 10 MG: 10 TABLET ORAL at 08:47

## 2021-01-25 RX ADMIN — SODIUM CHLORIDE, POTASSIUM CHLORIDE, SODIUM LACTATE AND CALCIUM CHLORIDE 75 ML/HR: 600; 310; 30; 20 INJECTION, SOLUTION INTRAVENOUS at 06:25

## 2021-01-25 RX ADMIN — GABAPENTIN 600 MG: 600 TABLET, FILM COATED ORAL at 16:21

## 2021-01-25 RX ADMIN — GABAPENTIN 600 MG: 600 TABLET, FILM COATED ORAL at 08:47

## 2021-01-25 RX ADMIN — MORPHINE SULFATE 2 MG: 2 INJECTION, SOLUTION INTRAMUSCULAR; INTRAVENOUS at 07:42

## 2021-01-25 RX ADMIN — Medication 10 ML: at 16:18

## 2021-01-25 RX ADMIN — GABAPENTIN 600 MG: 600 TABLET, FILM COATED ORAL at 21:05

## 2021-01-25 RX ADMIN — MORPHINE SULFATE 2 MG: 2 INJECTION, SOLUTION INTRAMUSCULAR; INTRAVENOUS at 12:10

## 2021-01-25 RX ADMIN — MORPHINE SULFATE 2 MG: 2 INJECTION, SOLUTION INTRAMUSCULAR; INTRAVENOUS at 21:05

## 2021-01-25 RX ADMIN — CLONAZEPAM 1 MG: 1 TABLET ORAL at 18:32

## 2021-01-25 RX ADMIN — Medication 17.2 MG: at 19:17

## 2021-01-25 RX ADMIN — Medication 17.2 MG: at 11:44

## 2021-01-25 NOTE — PROGRESS NOTES
Physician Progress Note      PATIENT:               Julian Washington  CSN #:                  [id number]  :                       1955  ADMIT DATE:       2021 1:55 PM  [address] DATE:  RESPONDING  PROVIDER #:        Lotus Hartman MD          QUERY TEXT:    Dear Dr. Jenniffer Rubinstein    Pt admitted with abdominal pain and questionable volvus. Colonoscopy was done this admission. 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    Current attending documentation reads Intractable abdominal pain sec to ?sigmoid volvulus Appreciate GI for ?colonoscopy/sigmoidoscopy    If possible, please document in progress notes and discharge summary:    The medical record reflects the following:    Risk Factors: 65M prescribed  Clinical Indicators:     Procedural Note Meagan FONSECA  Colonoscopy Operative Report  Indications Colonic distension - for decompression  Findings Very distended and redundant colon with difficulty passing scope beyond hepatic flexure. Scattered adherent stool seen in the transverse colon affecting visualization. No obvious obstruction or volvulus noted.   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    Treatment: Bowel Prep, Hospital Admission, Colonoscopy, GI Consult, Start clear liquid diet and advance diet to regular diet in 48 hours if no recurrence of abdominal distension, decrease narcotic use, SQ methylnaltrexone, enemas, senna 2 tabs twice daily,  miralax 2 capfuls with 1 tablespoon benefiber in 16 oz glass of water twice daily, rectal tube insertion for decompression      Thank you    [address] [place] [address]  Clinical Documentation Improvement  [phone]  Options provided:  -- Deborah Wilde ruled out and abdominal pain attributed to narcotic bowel syndrome  -- Other - I will add my own diagnosis  -- Disagree - Not applicable / Not valid  -- Disagree - Clinically unable to determine / Unknown  -- Refer to Clinical Documentation Reviewer    PROVIDER RESPONSE TEXT:    The diagnosis of volvus was ruled out and abdominal pain attributed to narcotic bowel syndrome    Query created by: Yumiko Roper on 1/25/2021 11:14 AM      Electronically signed by:  Gary Price MD 1/25/2021 11:30 AM

## 2021-01-25 NOTE — PROGRESS NOTES
Bedside and Verbal shift change report given to Dimas Peña RN (oncoming nurse) by Malachi Vines RN (offgoing nurse). Report included the following information SBAR, Kardex, Intake/Output, MAR and Recent Results.

## 2021-01-25 NOTE — PROGRESS NOTES
[address]  [facility], [address]       GI PROGRESS NOTE  Will Joo Maher  [phone] office  [phone] NP/PA in-hospital cell phone M-F until 4:30PM  After 5PM or on weekends, please call  for physician on call      NAME: Darcie Falk   :  1955   MRN:  [id number]       Subjective:   Patient reports improvement in abdominal distension. No nausea, vomiting, or abdominal pain. No bowel movement since colonoscopy. Colonoscopy was done yesterday with placement of rectal tube. Objective:     VITALS:   Last 24hrs VS reviewed since prior progress note. Most recent are:  Visit Vitals  BP (!) 109/55   Pulse 67   Temp 97.4 °F (36.3 °C)   Resp 18   SpO2 96%       PHYSICAL EXAM:  General: Cooperative, no acute distress    Neurologic:  Alert and oriented  HEENT: EOMI, no scleral icterus   Lungs:  No respiratory distress  Abdomen: Soft, non-distended, no tenderness, no guarding, no rebound. +Bowel sounds. Extremities: Warm  Psych:   Not anxious or agitated      Lab Data Reviewed:     No results found for this or any previous visit (from the past 24 hour(s)). 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    · Redundant colon  · Chronic back pain: on chronic narcotics     Patient Active Problem List   Diagnosis Code    Volvulus ([facility] [address] K56.2    Abdominal pain R10.9     Plan:   · Continue clear liquids  · Minimize narcotic use as able  · On Relistor SQ  · Senna 2 tabs twice daily  · MiraLax 2 capfuls twice daily  · Consider removing rectal tube in the next 24 hours  · If symptoms recur, consider surgical consult     Signed By: Lois Stevenson     2021  9:36 AM         GI Attending: Agree with above plan. Will continue to follow along with you. Jayant P. Cherylene Severs, MD

## 2021-01-25 NOTE — PROGRESS NOTES
Bedside shift change report given to Channing RN (oncoming nurse) by Papito Concepcion RN (offgoing nurse). Report included the following information SBAR, Kardex, Intake/Output, MAR and Recent Results.

## 2021-01-25 NOTE — PROGRESS NOTES
Transitions of Care plan: Home when stable    Reason for Readmission:    Abdominal pain           RUR Score/Risk Level:   15%, mode      PCP: First and Last name:  Dr. Pratima Palacio   Name of Practice:    Are you a current patient: Yes/No: yes   Approximate date of last visit: 6 months   Can you participate in a virtual visit with your PCP: no    Is a Care Conference indicated: no      Did you attend your follow up appointment (s): If not, why not:         Resources/supports as identified by patient/family:   Lives with supportive wife, has private duty caregivers Monday-Thursday from 10 am-4 pm.        Top Challenges facing patient (as identified by patient/family and CM): Finances/Medication cost?     No concerns  Transportation      Wife will transport home  Support system or lack thereof? See above   Living arrangements? Lives with wife  Self-care/ADLs/Cognition? Requires assist with ADLs       Current Advanced Directive/Advance Care Plan:  No ACP documents, patient is a FULL CODE           Plan for utilizing home health:   Unknown at this time if patient will need home health             Transition of Care Plan:    Based on readmission, the patient's previous Plan of Care   has been evaluated and/or modified. The current Transition of Care Plan is:       CM met with patient at the bedside to explain role and offer support. Patient was seen last week during his previous hospitalization. He returned due to abdominal pain and was able to have his needed colonoscopy yesterday. Patient now with rectal tube. CM will continue to follow and assist with any needs at discharge.        Readmission Assessment  Number of days since last admission?: 1-7 days  Previous disposition: Home with Family  Who is being interviewed?: Patient  Did you visit your Primary Care Physician after you left the hospital, before you returned this time?: No  Why weren't you able to visit your PCP?: Did not have an appointment  Did you see a specialist, such as Cardiac, Pulmonary, Orthopedic Physician, etc. after you left the hospital?: No  Who advised the patient to return to the hospital?: Physician, Self-referral  Does the patient report anything that got in the way of taking their medications?: No    Care Management Interventions  PCP Verified by CM: Yes  Palliative Care Criteria Met (RRAT>21 & CHF Dx)?: No  MyChart Signup: No  Discharge Durable Medical Equipment: No  Health Maintenance Reviewed: Yes  Physical Therapy Consult: No  Occupational Therapy Consult: No  Speech Therapy Consult: No  Current Support Network: Lives with Spouse  Confirm Follow Up Transport: Family  [employer] Information Provided?: No  Discharge Location  Discharge Placement: Home with family assistance    S.  [employer] , NATE

## 2021-01-25 NOTE — PROGRESS NOTES
Physical Therapy Screening:  Services are not indicated at this time. An InBasket screening referral was triggered for physical therapy based on results obtained during the nursing admission assessment. The patients chart was reviewed and the patient is not appropriate for a skilled therapy evaluation at this time. Please consult physical therapy if any therapy needs arise. Thank you.     Екатерина Nelson, PT

## 2021-01-25 NOTE — PROGRESS NOTES
[address] [facility]  Hospitalist Group                                                                                          Hospitalist Progress Note  Bren Up MD  Answering service: [phone] from in house phone        Date of Service:  2021  NAME:  Booker Ybarra  :  1955  MRN:  [id number]      Admission Summary: This is a 69-year-old man with a past medical history significant for hypertension; anxiety/depression; oropharyngeal cancer, status post chemoradiation treatment, complicated by difficulty with speech, was in his usual state of health until about a week ago when the patient developed abdominal pain. The abdominal pain is diffuse in location, constant, sharp pain with no known aggravating or relieving factors, associated with nausea. The patient and the spouse described the abdominal pain as severe, 10/10 in severity. No associated fever, rigors, or chills. No diarrhea. He was admitted to this hospital on 2021. The patient was admitted for about 5 days. The abdominal pain was attributed to suspected sigmoid volvulus. Gastroenterology consult was requested. The plan was for the patient to have colonoscopy done. Bowel preparation was started and after 5 days of hospitalization, according to the report, the bowel preparation was not adequate and the patient was discharged home for continuation of bowel preparation at home and for outpatient colonoscopy. The patient's spouse stated that the patient finally finished the bowel prep and called the gastroenterologist for the outpatient colonoscopy, but she was told by the gastroenterologist's office that the patient's insurance is not acceptable.   The patient's spouse stated that she called other gastroenterologist and she was told the same thing that the patient's insurance is not acceptable and she was subsequently advised to come back to the hospital so that she can have the colonoscopy done as an inpatient. Because of that, the patient came to the emergency room at [facility].  A repeat CT scan of the abdomen and pelvis shows the same finding, which is suggestive of sigmoid volvulus. The hospitalist service was asked to admit the patient to the hospital.  The patient has oropharyngeal cancer, status post chemoradiation and since then, the patient has not been able to speak. I communicated with the patient through his spouse who was present at the bedside.       Interval history / Subjective:     F/u ABdominal pain  Minimal abd pain  Continues on rectal tube  Feels much better     Assessment & Plan:      Intractable abdominal pain  -sec to ?sigmoid volvulus  -Appreciate GI, s/p rectal tube 1/24  -Continue rectal tube, likely removal tomorrow and then discharge    Oropharyngeal cancer, status post chemoradiation.  -Outpatient follow up    Arachnoiditis  -uses morphine 60mg BID at home for chronic pain  -continue gabapentin 600mg TID  -narcan as needed    Hx PE  -on coumdin due to PE in 2019, was on NOAC, but failed treatment and had progression of PE while on medication. 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 Edith Wilde then transitioned to argatro  - currently anticoagulation on hold, will restart tomorrow at discharge    Anxiety/depression  -Continue home meds    Hypertension  -stable on home meds    NPO    Code status: FULL CODE  DVT prophylaxis: scd  PTA: HOME    Plan: Follow GI, likely discharge tomorrow if cleared by GI    Care Plan discussed with: Patient/Family  Anticipated Disposition: Home w/Family  Anticipated Discharge: 24 hours to 48 hours     Hospital Problems  Date Reviewed: 1/24/2021          Codes Class Noted POA    * (Principal) Abdominal pain ICD-10-CM: R10.9  ICD-9-CM: 789.00  1/18/2021 Yes                Review of Systems:   A comprehensive review of systems was negative except for that written in the HPI.        Vital Signs:    Last 24hrs VS reviewed since prior progress note. Most recent are:  Visit Vitals  BP (!) 109/55   Pulse 67   Temp 97.4 °F (36.3 °C)   Resp 18   Ht 6' 5.5\" (1.969 m)   SpO2 96%   BMI 27.93 kg/m²         Intake/Output Summary (Last 24 hours) at 1/25/2021 1150  Last data filed at 1/25/2021 0745  Gross per 24 hour   Intake 900 ml   Output 1650 ml   Net -750 ml        Physical Examination:     I had a face to face encounter with this patient and independently examined them on 1/25/2021 as outlined below:          Constitutional:  No acute distress, cooperative, pleasant    ENT:  Oral mucosa moist, oropharynx benign. Resp:  CTA bilaterally. No wheezing/rhonchi/rales. No accessory muscle use   CV:  Regular rhythm, normal rate, no murmurs, gallops, rubs    GI:  Soft, non distended, non tender. normoactive bowel sounds, no hepatosplenomegaly     Musculoskeletal:  No edema, warm, 2+ pulses throughout    Neurologic:  Moves all extremities. AAOx3, CN II-XII reviewed     Skin:  Good turgor, no rashes or ulcers       Data Review:    Review and/or order of clinical lab test      Labs:     Recent Labs     01/23/21  0119 01/22/21  1418   WBC 8.6 8.8   HGB 13.9 15.7   HCT 41.4 46.4    200     Recent Labs     01/23/21  0119 01/22/21  1418    141   K 3.8 3.8    107   CO2 29 28   BUN 8 9   CREA 0.92 0.95   GLU 85 120*   CA 8.7 9.2   MG 2.4  --    PHOS 2.4*  --      Recent Labs     01/23/21  0119 01/22/21  1418   ALT 16 18    132*   TBILI 0.6 0.6   TP 7.4 8.6*   ALB 3.6 4.2   GLOB 3.8 4.4*   LPSE 33* 48*     No results for input(s): INR, PTP, APTT, INREXT, INREXT in the last 72 hours. No results for input(s): FE, TIBC, PSAT, FERR in the last 72 hours. No results found for: FOL, RBCF   No results for input(s): PH, PCO2, PO2 in the last 72 hours. No results for input(s): CPK, CKNDX, TROIQ in the last 72 hours.     No lab exists for component: CPKMB  No results found for: CHOL, CHOLX, CHLST, CHOLV, HDL, HDLP, LDL, LDLC, DLDLP, TGLX, TRIGL, TRIGP, [facility], [address]  [facility]  No results found for: Krysten Heads  No results found for: COLOR, APPRN, SPGRU, REFSG, GUSTAVO, PROTU, GLUCU, KETU, BILU, UROU, VIRY, LEUKU, GLUKE, EPSU, BACTU, WBCU, RBCU, CASTS, UCRY      Medications Reviewed:     Current Facility-Administered Medications   Medication Dose Route Frequency    senna (SENOKOT) tablet 17.2 mg  2 Tab Oral BID    polyethylene glycol (MIRALAX) packet 34 g  34 g Oral BID    sodium chloride (NS) flush 5-40 mL  5-40 mL IntraVENous Q8H    sodium chloride (NS) flush 5-40 mL  5-40 mL IntraVENous PRN    simethicone (MYLICON) 20MN/3.2ZM oral drops 80 mg  1.2 mL Oral Multiple    atropine injection 0.5 mg  0.5 mg IntraVENous ONCE PRN    EPINEPHrine (ADRENALIN) 0.1 mg/mL syringe 1 mg  1 mg Endoscopically ONCE PRN    methylnaltrexone (RELISTOR) injection 12 mg  12 mg SubCUTAneous DAILY PRN    clonazePAM (KlonoPIN) tablet 1 mg  1 mg Oral BID    escitalopram oxalate (LEXAPRO) tablet 10 mg  10 mg Oral DAILY    gabapentin (NEURONTIN) tablet 600 mg  600 mg Oral TID    HYDROcodone-acetaminophen (NORCO) 5-325 mg per tablet 1 Tab  1 Tab Oral Q4H PRN    triamterene-hydroCHLOROthiazide (MAXZIDE) 37.5-25 mg per tablet 1 Tab  1 Tab Oral DAILY    sodium chloride (NS) flush 5-40 mL  5-40 mL IntraVENous Q8H    sodium chloride (NS) flush 5-40 mL  5-40 mL IntraVENous PRN    acetaminophen (TYLENOL) tablet 650 mg  650 mg Oral Q6H PRN    Or    acetaminophen (TYLENOL) suppository 650 mg  650 mg Rectal Q6H PRN    polyethylene glycol (MIRALAX) packet 17 g  17 g Oral DAILY PRN    promethazine (PHENERGAN) tablet 12.5 mg  12.5 mg Oral Q6H PRN    Or    ondansetron (ZOFRAN) injection 4 mg  4 mg IntraVENous Q6H PRN    lactated Ringers infusion  75 mL/hr IntraVENous CONTINUOUS    morphine injection 2 mg  2 mg IntraVENous Q4H PRN     ______________________________________________________________________  EXPECTED LENGTH OF STAY: 4d 19h  ACTUAL LENGTH OF STAY:          3                 Venancio Sutton MD

## 2021-01-25 NOTE — PROGRESS NOTES
Comprehensive Nutrition Assessment    Type and Reason for Visit: Initial, Positive nutrition screen    Nutrition Recommendations/Plan:    1. Diet advancement per GI to: \"dental soft\" - edentulous  2. Obtain standing scale wt - no measured weight this admit    Nutrition Assessment:    Pt admitted for abd pain. PMHx: HTN, anxiety/depression, oraopharyngeal CA s/p chemoradiation. Abd pain PTA with nausea. Recent admit earlier this month noted with concerns for volvulus ruled out. Unable to complete colonoscopy with incomplete prep that admit. 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 Rectal tube placed for decompression with aggressive bowel regime including relistor in place. To continue clear liquids for now. Pt visited today. He reports wt loss previously with cancer dx but has been between 230-240# more recently with good appetite at home. Hungry today, agreeable to trial of Ensure Clear while on clear liquids. Edentulous so would like dental soft diet one diet advanced. No questions at this time. Malnutrition Assessment:  Malnutrition Status:  No malnutrition      Nutritionally Significant Medications: klonopin. Lexapro. Miralax, senna, LR @ 75ml/hr; PRN: relistor, zofran, simethicone,     Estimated Daily Nutrient Needs:  Energy (kcal): 2374-2612kcal(MSJ x 1.2 x 1-1.1); Weight Used for Energy Requirements: Other (specify)(107kg)  Protein (g): 107-118g (1-1.1g/kg);  Weight Used for Protein Requirements: (107kg)  Fluid (ml/day): 2400; Method Used for Fluid Requirements: 1 ml/kcal    Nutrition Related Findings:       BM: 1/24 - loose via rectal tube  Edema: none  Wounds:  None       Current Nutrition Therapies:   Diet: clear liquids  Supplements: none    Anthropometric Measures:  · Height:  6' 5.5\" (196.9 cm)  · Current Body Wt:  107.9 kg (237 lb 14 oz)   · Admission Body Wt:       · Usual Body Wt:        · Ideal Body Wt:   :  112.7 %   Wt Readings from Last 10 Encounters:   01/19/21 108.2 kg (238 lb 9.6 oz)   12/23/20 109.4 kg (241 lb 2.9 oz)   06/14/18 122 kg (269 lb)   01/29/14 122 kg (269 lb)     Nutrition Diagnosis:   · Inadequate oral intake related to altered GI function as evidenced by constipation, NPO or clear liquid status due to medical condition    Nutrition Interventions:   Food and/or Nutrient Delivery: Start oral nutrition supplement  Nutrition Education and Counseling: No recommendations at this time  Coordination of Nutrition Care: Continue to monitor while inpatient, Interdisciplinary rounds    Goals:  Diet advancement in 2-3 days       Nutrition Monitoring and Evaluation:   Behavioral-Environmental Outcomes: None identified  Food/Nutrient Intake Outcomes: Supplement intake, Diet advancement/tolerance  Physical Signs/Symptoms Outcomes: Weight, GI status    Discharge Planning:     Too soon to determine     Wero Strong, RD  [address] , Pager #[phone] or via Pleive

## 2021-01-26 PROCEDURE — 74011250637 HC RX REV CODE- 250/637: Performed by: INTERNAL MEDICINE

## 2021-01-26 PROCEDURE — 74011250637 HC RX REV CODE- 250/637: Performed by: PHYSICIAN ASSISTANT

## 2021-01-26 PROCEDURE — 65270000029 HC RM PRIVATE

## 2021-01-26 PROCEDURE — 74011250636 HC RX REV CODE- 250/636: Performed by: INTERNAL MEDICINE

## 2021-01-26 RX ADMIN — MORPHINE SULFATE 2 MG: 2 INJECTION, SOLUTION INTRAMUSCULAR; INTRAVENOUS at 07:20

## 2021-01-26 RX ADMIN — SODIUM CHLORIDE, POTASSIUM CHLORIDE, SODIUM LACTATE AND CALCIUM CHLORIDE 75 ML/HR: 600; 310; 30; 20 INJECTION, SOLUTION INTRAVENOUS at 07:19

## 2021-01-26 RX ADMIN — Medication 10 ML: at 17:38

## 2021-01-26 RX ADMIN — HYDROCODONE BITARTRATE AND ACETAMINOPHEN 1 TABLET: 5; 325 TABLET ORAL at 22:37

## 2021-01-26 RX ADMIN — GABAPENTIN 600 MG: 600 TABLET, FILM COATED ORAL at 10:01

## 2021-01-26 RX ADMIN — POLYETHYLENE GLYCOL 3350 34 G: 17 POWDER, FOR SOLUTION ORAL at 17:35

## 2021-01-26 RX ADMIN — Medication 17.2 MG: at 10:01

## 2021-01-26 RX ADMIN — MORPHINE SULFATE 2 MG: 2 INJECTION, SOLUTION INTRAMUSCULAR; INTRAVENOUS at 17:44

## 2021-01-26 RX ADMIN — POLYETHYLENE GLYCOL 3350 34 G: 17 POWDER, FOR SOLUTION ORAL at 10:01

## 2021-01-26 RX ADMIN — SODIUM CHLORIDE, POTASSIUM CHLORIDE, SODIUM LACTATE AND CALCIUM CHLORIDE 75 ML/HR: 600; 310; 30; 20 INJECTION, SOLUTION INTRAVENOUS at 22:37

## 2021-01-26 RX ADMIN — GABAPENTIN 600 MG: 600 TABLET, FILM COATED ORAL at 17:35

## 2021-01-26 RX ADMIN — CLONAZEPAM 1 MG: 1 TABLET ORAL at 10:01

## 2021-01-26 RX ADMIN — TRIAMTERENE AND HYDROCHLOROTHIAZIDE 1 TABLET: 37.5; 25 TABLET ORAL at 10:01

## 2021-01-26 RX ADMIN — Medication 10 ML: at 07:20

## 2021-01-26 RX ADMIN — MORPHINE SULFATE 2 MG: 2 INJECTION, SOLUTION INTRAMUSCULAR; INTRAVENOUS at 01:42

## 2021-01-26 RX ADMIN — Medication 17.2 MG: at 17:35

## 2021-01-26 RX ADMIN — GABAPENTIN 600 MG: 600 TABLET, FILM COATED ORAL at 22:29

## 2021-01-26 RX ADMIN — ESCITALOPRAM OXALATE 10 MG: 10 TABLET ORAL at 10:01

## 2021-01-26 RX ADMIN — HYDROCODONE BITARTRATE AND ACETAMINOPHEN 1 TABLET: 5; 325 TABLET ORAL at 10:10

## 2021-01-26 RX ADMIN — CLONAZEPAM 1 MG: 1 TABLET ORAL at 17:35

## 2021-01-26 RX ADMIN — MORPHINE SULFATE 2 MG: 2 INJECTION, SOLUTION INTRAMUSCULAR; INTRAVENOUS at 22:41

## 2021-01-26 NOTE — DISCHARGE SUMMARY
Discharge Summary       PATIENT ID: Leon Mariscal  MRN: [id number]   YOB: 1955    DATE OF ADMISSION: 1/22/2021  1:55 PM    DATE OF DISCHARGE: 1/26/2021   PRIMARY CARE PROVIDER: Ernie Mae MD     ATTENDING PHYSICIAN: Dr West Blunt  DISCHARGING PROVIDER: West Blunt MD    To contact this individual call [phone] and ask the  to page. If unavailable ask to be transferred the Adult Hospitalist Department. CONSULTATIONS: IP CONSULT TO GASTROENTEROLOGY    PROCEDURES/SURGERIES: Procedure(s):  COLONOSCOPY    ADMITTING DIAGNOSES & HOSPITAL COURSE:    Intractable abdominal pain  -sec to ?sigmoid volvulus, the patient does not have sigmoid volvulus  -Appreciate GI, s/p rectal tube 1/24  -Plan to take the rectal tube out and if tolerates, discharge the patient today vs tomorrow     Oropharyngeal cancer, status post chemoradiation.  -Outpatient follow up     Arachnoiditis  -uses morphine 60mg BID at home for chronic pain  -continue gabapentin 600mg TID  -narcan as needed     Hx PE  -on coumdin due to PE in 2019, was on NOAC, but failed treatment and had progression of PE while on medication. 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  He then transitioned to argatro  - currently anticoagulation on hold, will restart tomorrow at discharge     Anxiety/depression  -Continue home meds     Hypertension  -stable on home meds     Clear liquids  -Will advance to GI lite diet           DISCHARGE DIAGNOSES / PLAN:      1.   Abdominal pain/distesion     ADDITIONAL CARE RECOMMENDATIONS:   Follow up with PMD  Follow up with GI     PENDING TEST RESULTS:   At the time of discharge the following test results are still pending: none    FOLLOW UP APPOINTMENTS:    Follow-up Information     Follow up With Specialties Details Why Contact Keiko Montalvo MD Family Medicine In 1 week  [address]  [phone]      Isabella Leach MD Gastroenterology In 1 week [address]  [phone]               DIET: Cardiac Diet    ACTIVITY: Activity as tolerated      DISCHARGE MEDICATIONS:  Current Discharge Medication List      START taking these medications    Details   methylnaltrexone (RELISTOR) 12 mg/0.6 mL 0.6 mL by SubCUTAneous route daily as needed (10). Qty: 10 Vial, Refills: 0         CONTINUE these medications which have NOT CHANGED    Details   senna (Senna) 8.6 mg tablet Take 2 Tabs by mouth daily. Qty: 60 Tab, Refills: 0      polyethylene glycol (Miralax) 17 gram packet Take 1 Packet by mouth daily. 2-4 packets daily  Qty: 120 Packet, Refills: 1      docusate sodium (Dulcolax Stool Softener, dss,) 100 mg capsule Take 1 Cap by mouth two (2) times a day for 90 days. Qty: 60 Cap, Refills: 2      methylnaltrexone (Relistor) 150 mg tab tablet Take 3 Tabs by mouth Daily (before breakfast). Qty: 30 Tab, Refills: 0      predniSONE (DELTASONE) 20 mg tablet 2 qd for 4 d. 1 1/2 qd for 4 d. 1 qd for 4 d, 1/2 qd for 4 d. Qty: 20 Tab, Refills: 0      gabapentin (NEURONTIN) 600 mg tablet Take 600 mg by mouth three (3) times daily. zolpidem (AMBIEN) 10 mg tablet Take 10 mg by mouth nightly as needed for Sleep.      escitalopram (LEXAPRO) 10 mg tablet Take 10 mg by mouth daily. Morphine (RENY) 60 mg ER capsule Take 60 mg by mouth three (3) times daily. clonazePAM (KLONOPIN) 1 mg tablet Take 1 mg by mouth two (2) times a day. furosemide (LASIX) 20 mg tablet Take 20 mg by mouth two (2) times a day. potassium chloride (KLOR-CON M20) 20 mEq tablet Take 20 mEq by mouth as needed for Other (when taking fluid). triamterene-hydrochlorothiazide (MAXZIDE-25MG) 37.5-25 mg per tablet Take 1 Tab by mouth daily.       HYDROcodone-acetaminophen (NORCO) 5-325 mg per tablet 1-2 tabs PO Q 4-6 hrs PRN  Qty: 40 Tab, Refills: 0               NOTIFY YOUR PHYSICIAN FOR ANY OF THE FOLLOWING: Fever over 101 degrees for 24 hours. Chest pain, shortness of breath, fever, chills, nausea, vomiting, diarrhea, change in mentation, falling, weakness, bleeding. Severe pain or pain not relieved by medications. Or, any other signs or symptoms that you may have questions about.     DISPOSITION:  x  Home With:   OT  PT  HH  RN       Long term SNF/Inpatient Rehab    Independent/assisted living    Hospice    Other:       PATIENT CONDITION AT DISCHARGE:     Functional status    Poor     Deconditioned    x Independent      Cognition    x Lucid     Forgetful     Dementia      Catheters/lines (plus indication)    Tubbs     PICC     PEG    x None      Code status   x  Full code     DNR      PHYSICAL EXAMINATION AT DISCHARGE:  Please see progress note      CHRONIC MEDICAL DIAGNOSES:  Problem List as of 1/26/2021 Date Reviewed: 1/24/2021          Codes Class Noted - Resolved    * (Principal) Abdominal pain ICD-10-CM: R10.9  ICD-9-CM: 789.00  1/18/2021 - Present        Volvulus ([facility] [address] ICD-10-CM: K56.2  ICD-9-CM: 560.2  1/17/2021 - Present              Greater than 36 minutes were spent with the patient on counseling and coordination of care    Signed:   Magy Pope MD  1/26/2021  12:24 PM   .

## 2021-01-26 NOTE — DISCHARGE INSTRUCTIONS
Discharge Instructions       PATIENT ID: Samara Lim  MRN: [id number]   YOB: 1955    DATE OF ADMISSION: 1/22/2021  1:55 PM    DATE OF DISCHARGE: 1/26/2021    PRIMARY CARE PROVIDER: Radha Smith MD     ATTENDING PHYSICIAN: Shawn Cruz MD  DISCHARGING PROVIDER: Tao Conner MD    To contact this individual call [phone] and ask the  to page. If unavailable ask to be transferred the Adult Hospitalist Department. DISCHARGE DIAGNOSES   Abdominal distension    CONSULTATIONS: IP CONSULT TO GASTROENTEROLOGY    PROCEDURES/SURGERIES: Procedure(s):  COLONOSCOPY    PENDING TEST RESULTS:   At the time of discharge the following test results are still pending: none    FOLLOW UP APPOINTMENTS:   Follow-up Information     Follow up With Specialties Details Why Adelia Keith MD Family Medicine In 1 week  [address] [address]  [phone]      Mis Ramesh MD Gastroenterology In 1 week  [address] [facility]  [phone]             ADDITIONAL CARE RECOMMENDATIONS:   Follow up with PMD  Follow up with GI    DIET: Cardiac Diet    ACTIVITY: Activity as tolerated      DISCHARGE MEDICATIONS:   See Medication Reconciliation Form    · It is important that you take the medication exactly as they are prescribed. · Keep your medication in the bottles provided by the pharmacist and keep a list of the medication names, dosages, and times to be taken in your wallet. · Do not take other medications without consulting your doctor. NOTIFY YOUR PHYSICIAN FOR ANY OF THE FOLLOWING:   Fever over 101 degrees for 24 hours. Chest pain, shortness of breath, fever, chills, nausea, vomiting, diarrhea, change in mentation, falling, weakness, bleeding. Severe pain or pain not relieved by medications. Or, any other signs or symptoms that you may have questions about.       DISPOSITION:  x  Home With:   OT  PT    RN       SNF/Inpatient Rehab/LTAC    Independent/assisted living    Hospice    Other:     CDMP Checked:   Yes x     PROBLEM LIST Updated:  Yes x       Signed:   West Blunt MD  1/26/2021  12:23 PM

## 2021-01-26 NOTE — ROUTINE PROCESS
Attempted to schedule [facility] PCP appt with Dr. Bee Millan, unable to do so as the office is currently closed for lunch. Will attempt to call back.

## 2021-01-26 NOTE — PROGRESS NOTES
[address]  [address] [facility], [address]       GI PROGRESS NOTE  Sidra Keys, [address] office  [phone] NP/PA in-hospital cell phone M-F until 4:30PM  After 5PM or on weekends, please call  for physician on call      NAME: Leonides Keen   :  1955   MRN:  [id number]       Subjective:    Denies nausea or abdominal pain, no BM since procedure. Rectal tube still in place. Objective:     VITALS:   Last 24hrs VS reviewed since prior progress note. Most recent are:  Visit Vitals  /73   Pulse 81   Temp 98.3 °F (36.8 °C)   Resp 18   Ht 6' 5.5\" (1.969 m)   SpO2 100%   BMI 27.93 kg/m²       PHYSICAL EXAM:  General: Cooperative, no acute distress    Neurologic:  Alert and oriented  HEENT: EOMI, no scleral icterus   Lungs:  No respiratory distress  Abdomen: Soft, non-distended, no tenderness, no guarding, no rebound. Extremities: Warm  Psych:   Not anxious or agitated      Lab Data Reviewed:     No results found for this or any previous visit (from the past 24 hour(s)). 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 · Redundant colon  · Chronic back pain: on chronic narcotics     Patient Active Problem List   Diagnosis Code    Volvulus ([facility] [address] K56.2    Abdominal pain R10.9     Plan:   · Minimize narcotic use as able  · On Relistor SQ, Senna, and MiraLax   · Discontinue rectal tube  - discussed with RN     Signed By: Joelle Conn NP     2021  9:36 AM          GI Attending: Agree with above plan. Rectal tube removed today. If tolerated and he is otherwise doing well, he could be discharged tomorrow from GI perspective. Fernando De La Rosa MD

## 2021-01-26 NOTE — PROGRESS NOTES
[address] [facility] Adult  Hospitalist Group                                                                                          Hospitalist Progress Note  Dina Barahona MD  Answering service: [phone] from in house phone        Date of Service:  2021  NAME:  Leonides Keen  :  1955  MRN:  [id number]      Admission Summary: This is a 40-year-old man with a past medical history significant for hypertension; anxiety/depression; oropharyngeal cancer, status post chemoradiation treatment, complicated by difficulty with speech, was in his usual state of health until about a week ago when the patient developed abdominal pain. The abdominal pain is diffuse in location, constant, sharp pain with no known aggravating or relieving factors, associated with nausea. The patient and the spouse described the abdominal pain as severe, 10/10 in severity. No associated fever, rigors, or chills. No diarrhea. He was admitted to this hospital on 2021. The patient was admitted for about 5 days. The abdominal pain was attributed to suspected sigmoid volvulus. Gastroenterology consult was requested. The plan was for the patient to have colonoscopy done. Bowel preparation was started and after 5 days of hospitalization, according to the report, the bowel preparation was not adequate and the patient was discharged home for continuation of bowel preparation at home and for outpatient colonoscopy. The patient's spouse stated that the patient finally finished the bowel prep and called the gastroenterologist for the outpatient colonoscopy, but she was told by the gastroenterologist's office that the patient's insurance is not acceptable.   The patient's spouse stated that she called other gastroenterologist and she was told the same thing that the patient's insurance is not acceptable and she was subsequently advised to come back to the hospital so that she can have the colonoscopy done as an inpatient. Because of that, the patient came to the emergency room at [facility].  A repeat CT scan of the abdomen and pelvis shows the same finding, which is suggestive of sigmoid volvulus. The hospitalist service was asked to admit the patient to the hospital.  The patient has oropharyngeal cancer, status post chemoradiation and since then, the patient has not been able to speak. I communicated with the patient through his spouse who was present at the bedside.       Interval history / Subjective:     F/u ABdominal pain  Feels much better     Assessment & Plan:      Intractable abdominal pain  -sec to ?sigmoid volvulus  -Appreciate GI, s/p rectal tube 1/24  -Plan to take the rectal tube out and if tolerates, discharge the patient today vs tomorrow    Oropharyngeal cancer, status post chemoradiation.  -Outpatient follow up    Arachnoiditis  -uses morphine 60mg BID at home for chronic pain  -continue gabapentin 600mg TID  -narcan as needed    Hx PE  -on coumdin due to PE in 2019, was on NOAC, but failed treatment and had progression of PE while on medication. 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  He then transitioned to argatro  - currently anticoagulation on hold, will restart tomorrow at discharge    Anxiety/depression  -Continue home meds    Hypertension  -stable on home meds    Clear liquids  -Will advance to GI lite diet    Code status: FULL CODE  DVT prophylaxis: scd  PTA: HOME    Plan: if feels fine after rectal tube is out, will consider discharge today or tomorrow    Care Plan discussed with: Patient/Family  Anticipated Disposition: Home w/Family  Anticipated Discharge: 24 hours to 48 hours     Hospital Problems  Date Reviewed: 1/24/2021          Codes Class Noted POA    * (Principal) Abdominal pain ICD-10-CM: R10.9  ICD-9-CM: 789.00  1/18/2021 Yes                Review of Systems:   A comprehensive review of systems was negative except for that written in the HPI.        Vital Signs:    Last 24hrs VS reviewed since prior progress note. Most recent are:  Visit Vitals  /73   Pulse 81   Temp 98.3 °F (36.8 °C)   Resp 18   Ht 6' 5.5\" (1.969 m)   SpO2 100%   BMI 27.93 kg/m²         Intake/Output Summary (Last 24 hours) at 1/26/2021 1217  Last data filed at 1/26/2021 0720  Gross per 24 hour   Intake 1000 ml   Output 3375 ml   Net -2375 ml        Physical Examination:     I had a face to face encounter with this patient and independently examined them on 1/26/2021 as outlined below:          Constitutional:  No acute distress, cooperative, pleasant    ENT:  Oral mucosa moist, oropharynx benign. Resp:  CTA bilaterally. No wheezing/rhonchi/rales. No accessory muscle use   CV:  Regular rhythm, normal rate, no murmurs, gallops, rubs    GI:  Soft, non distended, non tender. normoactive bowel sounds, no hepatosplenomegaly     Musculoskeletal:  No edema, warm, 2+ pulses throughout    Neurologic:  Moves all extremities. AAOx3, CN II-XII reviewed     Skin:  Good turgor, no rashes or ulcers       Data Review:    Review and/or order of clinical lab test      Labs:     No results for input(s): WBC, HGB, HCT, PLT, HGBEXT, HCTEXT, PLTEXT, HGBEXT, HCTEXT, PLTEXT in the last 72 hours. No results for input(s): NA, K, CL, CO2, BUN, CREA, GLU, CA, MG, PHOS, URICA in the last 72 hours. No results for input(s): ALT, AP, TBIL, TBILI, TP, ALB, GLOB, GGT, AML, LPSE in the last 72 hours. No lab exists for component: SGOT, GPT, AMYP, HLPSE  No results for input(s): INR, PTP, APTT, INREXT, INREXT in the last 72 hours. No results for input(s): FE, TIBC, PSAT, FERR in the last 72 hours. No results found for: FOL, RBCF   No results for input(s): PH, PCO2, PO2 in the last 72 hours. No results for input(s): CPK, CKNDX, TROIQ in the last 72 hours.     No lab exists for component: CPKMB  No results found for: CHOL, CHOLX, CHLST, CHOLV, HDL, HDLP, LDL, LDLC, DLDLP, TGLX, TRIGL, TRIGP, CHHD, CHHDX  No results found for: GLUCPOC  No results found for: COLOR, APPRN, SPGRU, REFSG, GUSTAVO, PROTU, GLUCU, KETU, BILU, UROU, VIRY, LEUKU, GLUKE, EPSU, BACTU, WBCU, RBCU, CASTS, UCRY      Medications Reviewed:     Current Facility-Administered Medications   Medication Dose Route Frequency    senna (SENOKOT) tablet 17.2 mg  2 Tab Oral BID    polyethylene glycol (MIRALAX) packet 34 g  34 g Oral BID    sodium chloride (NS) flush 5-40 mL  5-40 mL IntraVENous Q8H    sodium chloride (NS) flush 5-40 mL  5-40 mL IntraVENous PRN    simethicone (MYLICON) 29BL/8.3VC oral drops 80 mg  1.2 mL Oral Multiple    methylnaltrexone (RELISTOR) injection 12 mg  12 mg SubCUTAneous DAILY PRN    clonazePAM (KlonoPIN) tablet 1 mg  1 mg Oral BID    escitalopram oxalate (LEXAPRO) tablet 10 mg  10 mg Oral DAILY    gabapentin (NEURONTIN) tablet 600 mg  600 mg Oral TID    HYDROcodone-acetaminophen (NORCO) 5-325 mg per tablet 1 Tab  1 Tab Oral Q4H PRN    triamterene-hydroCHLOROthiazide (MAXZIDE) 37.5-25 mg per tablet 1 Tab  1 Tab Oral DAILY    sodium chloride (NS) flush 5-40 mL  5-40 mL IntraVENous Q8H    sodium chloride (NS) flush 5-40 mL  5-40 mL IntraVENous PRN    acetaminophen (TYLENOL) tablet 650 mg  650 mg Oral Q6H PRN    Or    acetaminophen (TYLENOL) suppository 650 mg  650 mg Rectal Q6H PRN    polyethylene glycol (MIRALAX) packet 17 g  17 g Oral DAILY PRN    promethazine (PHENERGAN) tablet 12.5 mg  12.5 mg Oral Q6H PRN    Or    ondansetron (ZOFRAN) injection 4 mg  4 mg IntraVENous Q6H PRN    lactated Ringers infusion  75 mL/hr IntraVENous CONTINUOUS    morphine injection 2 mg  2 mg IntraVENous Q4H PRN     ______________________________________________________________________  EXPECTED LENGTH OF STAY: 3d 16h  ACTUAL LENGTH OF STAY:          Andie Milligan MD

## 2021-01-26 NOTE — PROGRESS NOTES
Bedside and Verbal shift change report given to Georgia Manriquez [facility] (oncoming nurse) by Beth Decker RN (offgoing nurse). Report included the following information SBAR, Kardex, Intake/Output and MAR.

## 2021-01-27 VITALS
SYSTOLIC BLOOD PRESSURE: 121 MMHG | DIASTOLIC BLOOD PRESSURE: 79 MMHG | OXYGEN SATURATION: 95 % | HEIGHT: 78 IN | TEMPERATURE: 97.5 F | BODY MASS INDEX: 27.93 KG/M2 | HEART RATE: 75 BPM | RESPIRATION RATE: 18 BRPM

## 2021-01-27 PROCEDURE — 74011250637 HC RX REV CODE- 250/637: Performed by: INTERNAL MEDICINE

## 2021-01-27 PROCEDURE — 74011250637 HC RX REV CODE- 250/637: Performed by: PHYSICIAN ASSISTANT

## 2021-01-27 RX ADMIN — Medication 17.2 MG: at 10:07

## 2021-01-27 RX ADMIN — ESCITALOPRAM OXALATE 10 MG: 10 TABLET ORAL at 10:07

## 2021-01-27 RX ADMIN — TRIAMTERENE AND HYDROCHLOROTHIAZIDE 1 TABLET: 37.5; 25 TABLET ORAL at 10:44

## 2021-01-27 RX ADMIN — GABAPENTIN 600 MG: 600 TABLET, FILM COATED ORAL at 10:07

## 2021-01-27 RX ADMIN — HYDROCODONE BITARTRATE AND ACETAMINOPHEN 1 TABLET: 5; 325 TABLET ORAL at 10:06

## 2021-01-27 RX ADMIN — CLONAZEPAM 1 MG: 1 TABLET ORAL at 10:07

## 2021-01-27 RX ADMIN — POLYETHYLENE GLYCOL 3350 34 G: 17 POWDER, FOR SOLUTION ORAL at 10:07

## 2021-01-27 NOTE — PROGRESS NOTES
[address]  [address] [facility], [address]       GI PROGRESS NOTE  Harlan Rainey, [facility] office  [phone] NP/PA in-hospital cell phone M-F until 4:30PM  After 5PM or on weekends, please call  for physician on call      NAME: Sada Noble   :  1955   MRN:  [id number]       Subjective:   He did well with rectal tube removal and starting diet. No nausea or abdominal pain. No abdominal distention. No BM. Objective:     VITALS:   Last 24hrs VS reviewed since prior progress note. Most recent are:  Visit Vitals  /74   Pulse 70   Temp 97.5 °F (36.4 °C)   Resp 18   Ht 6' 5.5\" (1.969 m)   SpO2 95%   BMI 27.93 kg/m²       PHYSICAL EXAM:  General: Cooperative, no acute distress    Neurologic:  Alert and oriented  HEENT: EOMI, no scleral icterus   Lungs:  No respiratory distress  Abdomen: Soft, non-distended, no tenderness, no guarding, no rebound. Extremities: Warm  Psych:   Not anxious or agitated      Lab Data Reviewed:     No results found for this or any previous visit (from the past 24 hour(s)). 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 · Redundant colon  · Chronic back pain: on chronic narcotics     Patient Active Problem List   Diagnosis Code    Volvulus ([facility] [address] K56.2    Abdominal pain R10.9     Plan:   · Minimize narcotic use as able  · On Relistor SQ, Senna, and MiraLax   · Agree with plans for discharge     Signed By: Vane Cummings NP     2021  9:36 AM           GI Attending: Agree with plan as above. We will see patient as an outpatient to discuss symptoms and bowel regimen. Fernando Wilder MD

## 2021-01-27 NOTE — ROUTINE PROCESS
Hospital follow-up PCP transitional care appointment has been scheduled with Dr. Nakia Treadwell for Feb 22 @ 4PM (earliest available). Pending patient discharge.   Rafi Johnson, Care Management Specialist.

## 2021-01-27 NOTE — PROGRESS NOTES
[address] [facility] Adult  Hospitalist Group Hospitalist Progress Note Cheikh Clark MD 
Answering service: [phone] OR 36 from in house phone Date of Service:  2021 NAME:  Irma Freitas :  1955 MRN:  [id number] Admission Summary: This is a 47-year-old man with a past medical history significant for hypertension; anxiety/depression; oropharyngeal cancer, status post chemoradiation treatment, complicated by difficulty with speech, was in his usual state of health until about a week ago when the patient developed abdominal pain. The abdominal pain is diffuse in location, constant, sharp pain with no known aggravating or relieving factors, associated with nausea. The patient and the spouse described the abdominal pain as severe, 10/10 in severity. No associated fever, rigors, or chills. No diarrhea. He was admitted to this hospital on 01/16/2021. The patient was admitted for about 5 days. The abdominal pain was attributed to suspected sigmoid volvulus. Gastroenterology consult was requested. The plan was for the patient to have colonoscopy done. Bowel preparation was started and after 5 days of hospitalization, according to the report, the bowel preparation was not adequate and the patient was discharged home for continuation of bowel preparation at home and for outpatient colonoscopy. The patient's spouse stated that the patient finally finished the bowel prep and called the gastroenterologist for the outpatient colonoscopy, but she was told by the gastroenterologist's office that the patient's insurance is not acceptable. The patient's spouse stated that she called other gastroenterologist and she was told the same thing that the patient's insurance is not acceptable and she was subsequently advised to come back to the hospital so that she can have the colonoscopy done as an inpatient. Because of that, the patient came to the emergency room at [facility].  A repeat CT scan of the abdomen and pelvis shows the same finding, which is suggestive of sigmoid volvulus.   The hospitalist service was asked to admit the patient to the hospital.  The patient has oropharyngeal cancer, status post chemoradiation and since then, the patient has not been able to speak. I communicated with the patient through his spouse who was present at the bedside. 
  
 
Interval history / Subjective: F/u ABdominal pain Rectal tube removed 1/26 Continues to feel stable No nausea or vomiting, abd pain Tolerating GI lite diet Assessment & Plan:  
 
 Intractable abdominal pain 
-sec to ?sigmoid volvulus 
-Appreciate GI, s/p rectal tube 1/24 and then removal 1/26 
-Discharge today to home Oropharyngeal cancer, status post chemoradiation. 
-Outpatient follow up Arachnoiditis 
-uses morphine 60mg BID at home for chronic pain 
-continue gabapentin 600mg TID 
-narcan as needed Hx PE 
-on coumdin due to PE in 2019, was on NOAC, but failed treatment and had progression of PE while on medication. 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 [facility] then transitioned to [employer] - currently anticoagulation on hold, will restart tomorrow at discharge Anxiety/depression 
-Continue home meds Hypertension 
-stable on home meds GI lite diet 
-tolerating Code status: FULL CODE 
DVT prophylaxis: scd PTA: HOME Plan:Discharge home Care Plan discussed with: Patient/Family Anticipated Disposition: Home w/Family Anticipated Discharge: 24 hours to 48 hours Hospital Problems  Date Reviewed: 1/24/2021 Codes Class Noted POA * (Principal) Abdominal pain ICD-10-CM: R10.9 ICD-9-CM: 789.00  1/18/2021 Yes Review of Systems: A comprehensive review of systems was negative except for that written in the HPI. Vital Signs:  
 Last 24hrs VS reviewed since prior progress note. Most recent are: 
Visit Vitals /74 Pulse 70 Temp 97.5 °F (36.4 °C) Resp 18 Ht 6' 5.5\" (1.969 m) SpO2 95% BMI 27.93 kg/m² Intake/Output Summary (Last 24 hours) at 1/27/2021 8323 Last data filed at 1/27/2021 0220 Gross per 24 hour Intake 1500 ml Output 2350 ml Net -850 ml Physical Examination: I had a face to face encounter with this patient and independently examined them on 1/27/2021 as outlined below: 
 
     
Constitutional:  No acute distress, cooperative, pleasant ENT:  Oral mucosa moist, oropharynx benign. Resp:  CTA bilaterally. No wheezing/rhonchi/rales. No accessory muscle use CV:  Regular rhythm, normal rate, no murmurs, gallops, rubs GI:  Soft, non distended, non tender. normoactive bowel sounds, no hepatosplenomegaly Musculoskeletal:  No edema, warm, 2+ pulses throughout Neurologic:  Moves all extremities. AAOx3, CN II-XII reviewed Skin:  Good turgor, no rashes or ulcers Data Review:  
 Review and/or order of clinical lab test 
 
 
Labs:  
 
No results for input(s): WBC, HGB, HCT, PLT, HGBEXT, HCTEXT, PLTEXT, HGBEXT, HCTEXT, PLTEXT in the last 72 hours. No results for input(s): NA, K, CL, CO2, BUN, CREA, GLU, CA, MG, PHOS, URICA in the last 72 hours. No results for input(s): ALT, AP, TBIL, TBILI, TP, ALB, GLOB, GGT, AML, LPSE in the last 72 hours. No lab exists for component: SGOT, GPT, AMYP, HLPSE No results for input(s): INR, PTP, APTT, INREXT, INREXT in the last 72 hours. No results for input(s): FE, TIBC, PSAT, FERR in the last 72 hours. No results found for: FOL, RBCF No results for input(s): PH, PCO2, PO2 in the last 72 hours. No results for input(s): CPK, CKNDX, TROIQ in the last 72 hours. No lab exists for component: CPKMB No results found for: CHOL, CHOLX, CHLST, CHOLV, HDL, HDLP, LDL, LDLC, DLDLP, TGLX, TRIGL, TRIGP, CHHD, CHHDX No results found for: [place] [employer] No results found for: COLOR, APPRN, SPGRU, REFSG, GUSTAVO, PROTU, GLUCU, KETU, BILU, UROU, VIRY, LEUKU, GLUKE, EPSU, BACTU, WBCU, RBCU, CASTS, UCRY Medications Reviewed:  
 
Current Facility-Administered Medications Medication Dose Route Frequency  senna (SENOKOT) tablet 17.2 mg  2 Tab Oral BID  polyethylene glycol (MIRALAX) packet 34 g  34 g Oral BID  
  sodium chloride (NS) flush 5-40 mL  5-40 mL IntraVENous Q8H  
 sodium chloride (NS) flush 5-40 mL  5-40 mL IntraVENous PRN  
 simethicone (MYLICON) 68ES/7.5FS oral drops 80 mg  1.2 mL Oral Multiple  methylnaltrexone (RELISTOR) injection 12 mg  12 mg SubCUTAneous DAILY PRN  
 clonazePAM (KlonoPIN) tablet 1 mg  1 mg Oral BID  escitalopram oxalate (LEXAPRO) tablet 10 mg  10 mg Oral DAILY  gabapentin (NEURONTIN) tablet 600 mg  600 mg Oral TID  
 HYDROcodone-acetaminophen (NORCO) 5-325 mg per tablet 1 Tab  1 Tab Oral Q4H PRN  
 triamterene-hydroCHLOROthiazide (MAXZIDE) 37.5-25 mg per tablet 1 Tab  1 Tab Oral DAILY  sodium chloride (NS) flush 5-40 mL  5-40 mL IntraVENous Q8H  
 sodium chloride (NS) flush 5-40 mL  5-40 mL IntraVENous PRN  
 acetaminophen (TYLENOL) tablet 650 mg  650 mg Oral Q6H PRN Or  
 acetaminophen (TYLENOL) suppository 650 mg  650 mg Rectal Q6H PRN  polyethylene glycol (MIRALAX) packet 17 g  17 g Oral DAILY PRN  promethazine (PHENERGAN) tablet 12.5 mg  12.5 mg Oral Q6H PRN Or  
 ondansetron (ZOFRAN) injection 4 mg  4 mg IntraVENous Q6H PRN  
 lactated Ringers infusion  75 mL/hr IntraVENous CONTINUOUS  
 morphine injection 2 mg  2 mg IntraVENous Q4H PRN  
 
______________________________________________________________________ EXPECTED LENGTH OF STAY: 3d 16h ACTUAL LENGTH OF STAY:          5 Omi Diaz MD

## 2021-01-27 NOTE — PROGRESS NOTES
\"stomach feels better\". Tolerated diet well . Discharge instructions reviewed. Understanding good.

## 2021-01-27 NOTE — PROGRESS NOTES
Bedside shift change report given to Jesse Schaumann, RN (oncoming nurse) by Makenna Acuña (offgoing nurse). Report included the following information SBAR and Kardex.

## 2021-02-04 NOTE — PROGRESS NOTES
Physician Progress Note      PATIENT:               Amari Styles  CSN #:                  [id number]  :                       1955  ADMIT DATE:       2021 1:55 PM  [address] [place] DATE:        2021 11:33 AM  RESPONDING  PROVIDER #:        Liban Fair MD          QUERY TEXT:    Dear Dr. Aviva Altamirano,    Patient presented with obstipation related to narcotic use. Progress notes state, \"Arachnoiditis-uses morphine 60mg BID at home for chronic pain-continue gabapentin 600mg TID-narcan as needed. \" Patient noted to be status post prior back surgery L5-S1 per GI consultant note. The specific type of surgery has not been specified in this encounter. A review of a  record shows that the patient under went lumbar laminectomy. After study, can the suspected etiology of the patient's arachnoiditis be further specified as: The medical record reflects the following:    Risk Factors: 77 M s/p back surgery in  with arachnoiditis    Clinical Indicators:     Sabina FONSECA H&P  PAST SURGICAL HISTORY:  This is significant for appendectomy, back surgery, cholecystectomy, PEG tube placement and removal, placement of vascular access and removal for chemotherapy.  Aviva Altamirano MD PN  Arachnoiditis  uses morphine 60mg BID at home for chronic pain  continue gabapentin 600mg TID  narcan?as needed     Meagan FONSECA GI Consult Note  Past Surgical History:  Procedure Laterality Date  HX APPENDECTOMY ? ?  HX BACK SURGERY ? ?  ? L5 - S1    2009 Gauri Ugarte MD DCS  SERVICE: ?Neurosurgery.   ATTENDING PHYSICIAN: ?Sarah Hussein MD.  DISCHARGE DIAGNOSIS: ?Lumbar arachnoid cyst.  OPERATION PERFORMED: ?Lumbar laminectomy, excision of arachnoid cyst.    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     Query created by: James Pearson on 2/2/2021 10:11 AM      Electronically signed by:  Trisha Pace MD 2/4/2021 8:03 AM

## 2022-03-19 PROBLEM — K56.2 VOLVULUS (HCC): Status: ACTIVE | Noted: 2021-01-17

## 2022-03-20 PROBLEM — R10.9 ABDOMINAL PAIN: Status: ACTIVE | Noted: 2021-01-18

## 2023-08-15 ENCOUNTER — HOSPITAL ENCOUNTER (OUTPATIENT)
Facility: HOSPITAL | Age: 68
Discharge: HOME | End: 2023-08-18
Attending: PHYSICAL MEDICINE & REHABILITATION
Payer: MEDICARE

## 2023-08-15 DIAGNOSIS — M47.816 LUMBAR SPONDYLOSIS: ICD-10-CM

## 2023-08-15 DIAGNOSIS — M54.16 LUMBAR RADICULITIS: ICD-10-CM

## 2023-08-15 DIAGNOSIS — M96.1 POST LAMINECTOMY SYNDROME: ICD-10-CM

## 2023-08-15 PROCEDURE — 72148 MRI LUMBAR SPINE W/O DYE: CPT

## 2023-09-14 ENCOUNTER — HOSPITAL ENCOUNTER (OUTPATIENT)
Facility: HOSPITAL | Age: 68
Discharge: HOME | End: 2023-09-16
Attending: PHYSICAL MEDICINE & REHABILITATION
Payer: MEDICARE

## 2023-09-14 DIAGNOSIS — M79.672 BILATERAL LEG AND FOOT PAIN: ICD-10-CM

## 2023-09-14 DIAGNOSIS — M79.605 BILATERAL LEG AND FOOT PAIN: ICD-10-CM

## 2023-09-14 DIAGNOSIS — M79.671 BILATERAL LEG AND FOOT PAIN: ICD-10-CM

## 2023-09-14 DIAGNOSIS — M79.604 BILATERAL LEG AND FOOT PAIN: ICD-10-CM

## 2023-09-14 PROCEDURE — 93922 UPR/L XTREMITY ART 2 LEVELS: CPT

## 2023-09-17 LAB
VAS LEFT ABI: 1.17
VAS LEFT ARM BP: 131 MMHG
VAS LEFT DORSALIS PEDIS BP: 164 MMHG
VAS LEFT PTA BP: 153 MMHG
VAS RIGHT ABI: 1.05
VAS RIGHT ARM BP: 140 MMHG
VAS RIGHT DORSALIS PEDIS BP: 147 MMHG
VAS RIGHT PTA BP: 140 MMHG

## (undated) DEVICE — CONNECTOR TBNG AUX H2O JET DISP FOR OLY 160/180 SER

## (undated) DEVICE — CATH IV AUTOGRD BC BLU 22GA 25 -- INSYTE

## (undated) DEVICE — 1200 GUARD II KIT W/5MM TUBE W/O VAC TUBE: Brand: GUARDIAN

## (undated) DEVICE — Z DISCONTINUED USE 2751540 TUBING IRRIG L10IN DISP PMP ENDOGATOR

## (undated) DEVICE — SYRINGE MED 20ML STD CLR PLAS LUERLOCK TIP N CTRL DISP

## (undated) DEVICE — AIRLIFE™ U/CONNECT-IT OXYGEN TUBING 7 FEET (2.1 M) CRUSH-RESISTANT OXYGEN TUBING, VINYL TIPPED: Brand: AIRLIFE™

## (undated) DEVICE — SET ADMIN 16ML TBNG L100IN 2 Y INJ SITE IV PIGGY BK DISP

## (undated) DEVICE — Z DISCONTINUED NO SUB IDED SET EXTN W/ 4 W STPCOCK M SPIN LOK 36IN

## (undated) DEVICE — KENDALL RADIOLUCENT FOAM MONITORING ELECTRODE -RECTANGULAR SHAPE: Brand: KENDALL

## (undated) DEVICE — NEEDLE HYPO 18GA L1.5IN PNK S STL HUB POLYPR SHLD REG BVL

## (undated) DEVICE — QUILTED PREMIUM COMFORT UNDERPAD,EXTRA HEAVY: Brand: WINGS

## (undated) DEVICE — SNARE ENDOSCP L240CM LOOP W27MM SHTH DIA2.4MM WRK CHN 2.8MM

## (undated) DEVICE — ENDO CARRY-ON PROCEDURE KIT INCLUDES ENZYMATIC SPONGE, GAUZE, BIOHAZARD LABEL, TRAY, LUBRICANT, DIRTY SCOPE LABEL, WATER LABEL, TRAY, DRAWSTRING PAD, AND DEFENDO 4-PIECE KIT.: Brand: ENDO CARRY-ON PROCEDURE KIT

## (undated) DEVICE — BAG DRAIN URIN 2000ML LF STRL -- CONVERT TO ITEM 363123

## (undated) DEVICE — KIT IV STRT W CHLORAPREP PD 1ML

## (undated) DEVICE — SOLIDIFIER FLUID 3000 CC ABSORB

## (undated) DEVICE — BAG BELONG PT PERS CLEAR HANDL

## (undated) DEVICE — SOLUTION IV 500ML 0.9% SOD CHL FLX CONT

## (undated) DEVICE — CANN NASAL O2 CAPNOGRAPHY AD -- FILTERLINE

## (undated) DEVICE — BW-412T DISP COMBO CLEANING BRUSH: Brand: SINGLE USE COMBINATION CLEANING BRUSH

## (undated) DEVICE — BITE BLK ENDOSCP AD 54FR GRN POLYETH ENDOSCP W STRP SLD